# Patient Record
Sex: MALE | Race: WHITE | Employment: OTHER | URBAN - METROPOLITAN AREA
[De-identification: names, ages, dates, MRNs, and addresses within clinical notes are randomized per-mention and may not be internally consistent; named-entity substitution may affect disease eponyms.]

---

## 2017-10-02 ENCOUNTER — TELEPHONE (OUTPATIENT)
Dept: FAMILY MEDICINE | Facility: CLINIC | Age: 82
End: 2017-10-02

## 2017-10-02 DIAGNOSIS — L60.0 INGROWN TOENAIL: ICD-10-CM

## 2017-10-02 NOTE — TELEPHONE ENCOUNTER
Spoke to Lucho at [facility] pharmacy, prescription was erroneously sent to Dr Cobb.  Will send prescription to the correct provider.   Verbalized good understanding.   Dunia Zamudio RN

## 2017-10-02 NOTE — TELEPHONE ENCOUNTER
[facility] sent request for Gabapentin 300mg , take one capsule three times daily.  Last filled 7/10/2011

## 2017-10-02 NOTE — TELEPHONE ENCOUNTER
I have not seen the patient for 6 years he needs to reestablish care at our clinic if he wants us to take over prescribing these.  Hitesh Cobb MD

## 2021-12-08 ENCOUNTER — ANCILLARY PROCEDURE (OUTPATIENT)
Dept: CT IMAGING | Facility: CLINIC | Age: 86
End: 2021-12-08
Attending: NURSE PRACTITIONER
Payer: COMMERCIAL

## 2021-12-08 ENCOUNTER — OFFICE VISIT (OUTPATIENT)
Dept: URGENT CARE | Facility: URGENT CARE | Age: 86
End: 2021-12-08
Payer: COMMERCIAL

## 2021-12-08 ENCOUNTER — OFFICE VISIT (OUTPATIENT)
Dept: PEDIATRICS | Facility: CLINIC | Age: 86
End: 2021-12-08
Attending: FAMILY MEDICINE
Payer: COMMERCIAL

## 2021-12-08 VITALS
DIASTOLIC BLOOD PRESSURE: 74 MMHG | TEMPERATURE: 97.6 F | RESPIRATION RATE: 16 BRPM | HEART RATE: 112 BPM | WEIGHT: 185 LBS | OXYGEN SATURATION: 96 % | SYSTOLIC BLOOD PRESSURE: 127 MMHG

## 2021-12-08 VITALS
HEART RATE: 76 BPM | RESPIRATION RATE: 18 BRPM | SYSTOLIC BLOOD PRESSURE: 128 MMHG | TEMPERATURE: 97.7 F | OXYGEN SATURATION: 98 % | DIASTOLIC BLOOD PRESSURE: 76 MMHG

## 2021-12-08 DIAGNOSIS — E11.42 TYPE 2 DIABETES MELLITUS WITH DIABETIC POLYNEUROPATHY, WITHOUT LONG-TERM CURRENT USE OF INSULIN (H): Primary | ICD-10-CM

## 2021-12-08 DIAGNOSIS — R10.84 ABDOMINAL PAIN, GENERALIZED: ICD-10-CM

## 2021-12-08 DIAGNOSIS — N18.31 STAGE 3A CHRONIC KIDNEY DISEASE (H): ICD-10-CM

## 2021-12-08 DIAGNOSIS — R11.2 NAUSEA VOMITING AND DIARRHEA: ICD-10-CM

## 2021-12-08 DIAGNOSIS — K57.90 DIVERTICULOSIS OF INTESTINE, PART UNSPECIFIED, WITHOUT PERFORATION OR ABSCESS WITHOUT BLEEDING: ICD-10-CM

## 2021-12-08 DIAGNOSIS — R10.84 ABDOMINAL PAIN, GENERALIZED: Primary | ICD-10-CM

## 2021-12-08 DIAGNOSIS — R11.2 NAUSEA VOMITING AND DIARRHEA: Primary | ICD-10-CM

## 2021-12-08 DIAGNOSIS — R19.7 NAUSEA VOMITING AND DIARRHEA: Primary | ICD-10-CM

## 2021-12-08 DIAGNOSIS — R73.9 HYPERGLYCEMIA: ICD-10-CM

## 2021-12-08 DIAGNOSIS — R19.7 NAUSEA VOMITING AND DIARRHEA: ICD-10-CM

## 2021-12-08 PROBLEM — I65.22 STENOSIS OF LEFT CAROTID ARTERY: Status: ACTIVE | Noted: 2021-12-08

## 2021-12-08 PROBLEM — M75.110 PARTIAL TEAR OF ROTATOR CUFF: Status: ACTIVE | Noted: 2021-12-08

## 2021-12-08 PROBLEM — M47.816 DEGENERATIVE ARTHRITIS OF LUMBAR SPINE: Status: ACTIVE | Noted: 2021-12-08

## 2021-12-08 PROBLEM — J98.4 CHRONIC RESTRICTIVE LUNG DISEASE: Status: ACTIVE | Noted: 2021-12-08

## 2021-12-08 PROBLEM — K21.9 GASTROESOPHAGEAL REFLUX DISEASE: Status: ACTIVE | Noted: 2021-12-08

## 2021-12-08 PROBLEM — G47.00 INSOMNIA: Status: ACTIVE | Noted: 2021-12-08

## 2021-12-08 PROBLEM — Z86.73 HISTORY OF CEREBROVASCULAR ACCIDENT: Status: ACTIVE | Noted: 2021-12-08

## 2021-12-08 PROBLEM — G62.9 NEUROPATHY: Status: ACTIVE | Noted: 2021-12-08

## 2021-12-08 PROBLEM — E11.9 DM (DIABETES MELLITUS), TYPE 2 (H): Status: ACTIVE | Noted: 2021-12-08

## 2021-12-08 PROBLEM — I44.2 COMPLETE HEART BLOCK (H): Status: ACTIVE | Noted: 2021-12-08

## 2021-12-08 PROBLEM — D64.9 ANEMIA: Status: ACTIVE | Noted: 2021-12-08

## 2021-12-08 PROBLEM — I63.9 STROKE (H): Status: ACTIVE | Noted: 2021-12-08

## 2021-12-08 PROBLEM — M47.812 DEGENERATIVE ARTHRITIS OF CERVICAL SPINE: Status: ACTIVE | Noted: 2021-07-11

## 2021-12-08 PROBLEM — L65.9 ALOPECIA: Status: ACTIVE | Noted: 2021-12-08

## 2021-12-08 PROBLEM — Z95.0 PACEMAKER: Status: ACTIVE | Noted: 2021-12-08

## 2021-12-08 LAB
ALBUMIN SERPL-MCNC: 3.6 G/DL (ref 3.4–5)
ALBUMIN UR-MCNC: 10 MG/DL
ALP SERPL-CCNC: 114 U/L (ref 40–150)
ALT SERPL W P-5'-P-CCNC: 23 U/L (ref 0–70)
ANION GAP SERPL CALCULATED.3IONS-SCNC: 5 MMOL/L (ref 3–14)
APPEARANCE UR: CLEAR
AST SERPL W P-5'-P-CCNC: 12 U/L (ref 0–45)
BACTERIA #/AREA URNS HPF: ABNORMAL /HPF
BASOPHILS # BLD AUTO: 0.1 10E3/UL (ref 0–0.2)
BASOPHILS NFR BLD AUTO: 1 %
BILIRUB SERPL-MCNC: 0.8 MG/DL (ref 0.2–1.3)
BILIRUB UR QL STRIP: NEGATIVE
BUN SERPL-MCNC: 17 MG/DL (ref 7–30)
CALCIUM SERPL-MCNC: 9.2 MG/DL (ref 8.5–10.1)
CHLORIDE BLD-SCNC: 107 MMOL/L (ref 94–109)
CO2 SERPL-SCNC: 27 MMOL/L (ref 20–32)
COLOR UR AUTO: YELLOW
CREAT SERPL-MCNC: 1.06 MG/DL (ref 0.66–1.25)
CRP SERPL-MCNC: <2.9 MG/L (ref 0–8)
EOSINOPHIL # BLD AUTO: 0.1 10E3/UL (ref 0–0.7)
EOSINOPHIL NFR BLD AUTO: 2 %
ERYTHROCYTE [DISTWIDTH] IN BLOOD BY AUTOMATED COUNT: 12.8 % (ref 10–15)
ERYTHROCYTE [SEDIMENTATION RATE] IN BLOOD BY WESTERGREN METHOD: 21 MM/HR (ref 0–20)
GFR SERPL CREATININE-BSD FRML MDRD: 64 ML/MIN/1.73M2
GLUCOSE BLD-MCNC: 173 MG/DL (ref 70–99)
GLUCOSE UR STRIP-MCNC: 30 MG/DL
HBA1C MFR BLD: 8.3 % (ref 0–5.6)
HCT VFR BLD AUTO: 36.3 % (ref 40–53)
HGB BLD-MCNC: 12 G/DL (ref 13.3–17.7)
HGB UR QL STRIP: NEGATIVE
HOLD SPECIMEN: NORMAL
IMM GRANULOCYTES # BLD: 0 10E3/UL
IMM GRANULOCYTES NFR BLD: 0 %
INR PPP: 0.97 (ref 0.85–1.15)
KETONES UR STRIP-MCNC: NEGATIVE MG/DL
LEUKOCYTE ESTERASE UR QL STRIP: NEGATIVE
LIPASE SERPL-CCNC: 132 U/L (ref 73–393)
LYMPHOCYTES # BLD AUTO: 2 10E3/UL (ref 0.8–5.3)
LYMPHOCYTES NFR BLD AUTO: 25 %
MCH RBC QN AUTO: 30.8 PG (ref 26.5–33)
MCHC RBC AUTO-ENTMCNC: 33.1 G/DL (ref 31.5–36.5)
MCV RBC AUTO: 93 FL (ref 78–100)
MONOCYTES # BLD AUTO: 0.5 10E3/UL (ref 0–1.3)
MONOCYTES NFR BLD AUTO: 7 %
MUCOUS THREADS #/AREA URNS LPF: PRESENT /LPF
NEUTROPHILS # BLD AUTO: 5.3 10E3/UL (ref 1.6–8.3)
NEUTROPHILS NFR BLD AUTO: 65 %
NITRATE UR QL: NEGATIVE
NRBC # BLD AUTO: 0 10E3/UL
NRBC BLD AUTO-RTO: 0 /100
PH UR STRIP: 5.5 [PH] (ref 5–7)
PLATELET # BLD AUTO: 227 10E3/UL (ref 150–450)
POTASSIUM BLD-SCNC: 3.9 MMOL/L (ref 3.4–5.3)
PROT SERPL-MCNC: 7.1 G/DL (ref 6.8–8.8)
RBC # BLD AUTO: 3.89 10E6/UL (ref 4.4–5.9)
RBC #/AREA URNS AUTO: ABNORMAL /HPF
SODIUM SERPL-SCNC: 139 MMOL/L (ref 133–144)
SP GR UR STRIP: >1.05 (ref 1–1.03)
SQUAMOUS #/AREA URNS AUTO: ABNORMAL /LPF
UROBILINOGEN UR STRIP-MCNC: NORMAL MG/DL
WBC # BLD AUTO: 8.1 10E3/UL (ref 4–11)
WBC #/AREA URNS AUTO: ABNORMAL /HPF

## 2021-12-08 PROCEDURE — 85610 PROTHROMBIN TIME: CPT | Performed by: NURSE PRACTITIONER

## 2021-12-08 PROCEDURE — 81001 URINALYSIS AUTO W/SCOPE: CPT | Performed by: NURSE PRACTITIONER

## 2021-12-08 PROCEDURE — 96361 HYDRATE IV INFUSION ADD-ON: CPT | Performed by: NURSE PRACTITIONER

## 2021-12-08 PROCEDURE — 85652 RBC SED RATE AUTOMATED: CPT | Performed by: NURSE PRACTITIONER

## 2021-12-08 PROCEDURE — 85025 COMPLETE CBC W/AUTO DIFF WBC: CPT | Performed by: NURSE PRACTITIONER

## 2021-12-08 PROCEDURE — 74177 CT ABD & PELVIS W/CONTRAST: CPT | Performed by: STUDENT IN AN ORGANIZED HEALTH CARE EDUCATION/TRAINING PROGRAM

## 2021-12-08 PROCEDURE — 86140 C-REACTIVE PROTEIN: CPT | Performed by: NURSE PRACTITIONER

## 2021-12-08 PROCEDURE — 83036 HEMOGLOBIN GLYCOSYLATED A1C: CPT | Performed by: NURSE PRACTITIONER

## 2021-12-08 PROCEDURE — 99205 OFFICE O/P NEW HI 60 MIN: CPT | Mod: 25 | Performed by: NURSE PRACTITIONER

## 2021-12-08 PROCEDURE — 99207 REFERRAL TO ACUTE AND DIAGNOSTIC SERVICES: CPT | Performed by: FAMILY MEDICINE

## 2021-12-08 PROCEDURE — 80053 COMPREHEN METABOLIC PANEL: CPT | Performed by: NURSE PRACTITIONER

## 2021-12-08 PROCEDURE — 96360 HYDRATION IV INFUSION INIT: CPT | Performed by: NURSE PRACTITIONER

## 2021-12-08 PROCEDURE — 36415 COLL VENOUS BLD VENIPUNCTURE: CPT | Performed by: NURSE PRACTITIONER

## 2021-12-08 PROCEDURE — 83690 ASSAY OF LIPASE: CPT | Performed by: NURSE PRACTITIONER

## 2021-12-08 RX ORDER — FLUOCINOLONE ACETONIDE 0.1 MG/ML
SOLUTION TOPICAL
COMMUNITY
Start: 2021-12-07

## 2021-12-08 RX ORDER — IOPAMIDOL 755 MG/ML
114 INJECTION, SOLUTION INTRAVASCULAR ONCE
Status: COMPLETED | OUTPATIENT
Start: 2021-12-08 | End: 2021-12-08

## 2021-12-08 RX ORDER — TRAZODONE HYDROCHLORIDE 50 MG/1
50 TABLET, FILM COATED ORAL
COMMUNITY
Start: 2021-09-16 | End: 2022-03-10

## 2021-12-08 RX ORDER — KETOCONAZOLE 20 MG/ML
SHAMPOO TOPICAL
COMMUNITY
Start: 2021-12-07

## 2021-12-08 RX ADMIN — Medication 1000 ML: at 14:27

## 2021-12-08 RX ADMIN — IOPAMIDOL 114 ML: 755 INJECTION, SOLUTION INTRAVASCULAR at 15:11

## 2021-12-08 ASSESSMENT — PAIN SCALES - GENERAL: PAINLEVEL: MODERATE PAIN (5)
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
## 2021-12-08 NOTE — PROGRESS NOTES
Chief Complaint   Patient presents with     Abdominal Pain         ICD-10-CM    1. Type 2 diabetes mellitus with diabetic polyneuropathy, without long-term current use of insulin (H)  E11.42 metFORMIN (GLUCOPHAGE) 500 MG tablet   2. Nausea vomiting and diarrhea  R11.2 0.9% sodium chloride BOLUS    R19.7 CBC with platelets differential     Comprehensive metabolic panel     Clostridium difficile toxin B PCR     CRP inflammation     Erythrocyte sedimentation rate auto     INR     Lipase     UA with Microscopic reflex to Culture     CT Abdomen Pelvis w Contrast     UA with Microscopic reflex to Culture     Lipase     INR     Erythrocyte sedimentation rate auto     CRP inflammation     Comprehensive metabolic panel     CBC with platelets differential     Clostridium difficile toxin B PCR     Clostridium difficile toxin B PCR     Urine Microscopic     Adult Gastro Ref - Consult Only     CANCELED: Clostridium difficile toxin B PCR   3. Abdominal pain, generalized  R10.84 0.9% sodium chloride BOLUS     CBC with platelets differential     Comprehensive metabolic panel     CRP inflammation     Erythrocyte sedimentation rate auto     UA with Microscopic reflex to Culture     CT Abdomen Pelvis w Contrast     UA with Microscopic reflex to Culture     Erythrocyte sedimentation rate auto     CRP inflammation     Comprehensive metabolic panel     CBC with platelets differential     Clostridium difficile toxin B PCR     Clostridium difficile toxin B PCR     Urine Microscopic     Adult Gastro Ref - Consult Only   4. Stage 3a chronic kidney disease (H)  N18.31 Comprehensive metabolic panel     UA with Microscopic reflex to Culture     UA with Microscopic reflex to Culture     Comprehensive metabolic panel     Urine Microscopic   5. Diverticulosis of intestine, part unspecified, without perforation or abscess without bleeding   K57.90 INR     INR     Adult Gastro Ref - Consult Only   6. Hyperglycemia  R73.9 Hemoglobin A1c      Hemoglobin A1c     metFORMIN (GLUCOPHAGE) 500 MG tablet   Patient was given a total of 1500 mL of fluid and lung sounds remained clear.  He was much brighter and interactive after the fluids were infused.  Vital signs remained stable.    Restart Metformin 1 tablet in the morning with breakfast for 2 weeks then increase to 1 tablet with breakfast and 1 tablet with your evening meal.    Keep appointment with new provider on December 20, 2021 to review findings from this visit and establish care.    Follow-up with gastroenterology regarding your abdominal symptoms.    69 minutes spent on the date of the encounter doing chart review, history and exam, documentation and further activities per the note    Medical Decision Making    Differential Diagnosis:  A differential diagnosis for the abdominal pain includes but is not limited to: Gastritis, gastroenteritis, enteritis, colitis, irritable bowel disease, inflammatory bowel disease, appendicitis, viral infection, diverticulitis, ileus, bowel obstruction, volvulus, ileus, intussusception, gas pains, indigestion, gastroesophageal reflux, aortic pathology, urinary tract infection, pyelonephritis, cystitis, gastric ulcer, duodenal ulcer, viscus perforation, mesenteric ischemia, Crohn's disease, abdominal aortic dissection, ulcerative colitis, renal stone, biliary colic, cholecystitis, pancreatitis, abdominal hernia, internal hernia.    Narrative & Impression   Examination:  CT ABDOMEN PELVIS W CONTRAST 12/8/2021 3:33 PM      History: Diarrhea, nausea vomiting     Comparison: CT AP 11/12/2010     Technique: CT of the abdomen and pelvis were obtained with contrast.  Sagittal and coronal reconstructions created and reviewed. Contrast  dose: Isovue 370 114 cc     Findings:      Abdomen and pelvis: Normal hepatic parenchyma without focal lesions.  Gallbladder is unremarkable. No intra- or extrahepatic biliary  dilation. Spleen is unremarkable. Two soft tissue nodules in the  left  upper quadrant, most likely splenules. The pancreas and adrenal glands  are unremarkable. Kidneys demonstrate symmetric enhancement without  solid lesions, hydronephrosis, or nephrolithiasis. Ureters and urinary  bladder are unremarkable. Mild prostatomegaly with transitional zone  calcifications.     Stomach is unremarkable. No evidence of bowel obstruction. Minimal  colonic stool burden. Colonic diverticulosis, predominantly involving  the sigmoid colon, without evidence of acute diverticulitis. Normal  caliber appendix.     No suspicious abdominal or pelvic lymphadenopathy. No free fluid. No  pneumoperitoneum.     Abdominal aorta is normal in caliber and patent. Moderate calcified  atherosclerotic disease of the infrarenal aorta Celiac and mesenteric  artery origins are unremarkable. Portal veins and IVC are patent.     Lower thorax: Bibasilar scarring. Partially visualized cardiac  pacemaker wires terminating in the right atrium and ventricle. No  pericardial effusion.     Bones and soft tissues: No acute or suspicious osseous abnormality.  Degenerative changes of the lumbar spine. Mild degenerative changes of  bilateral hips, left greater than right. Dystrophic calcifications in  the overlying the gluteal musculature. Fatty replacement of the  paraspinal musculature.                                                                      Impression:   1. No radiographic findings to account for diarrhea or nausea. No  evidence of bowel obstruction or enteritis/colitis.  2. Colonic diverticulosis without evidence of acute diverticulitis.         Results for orders placed or performed in visit on 12/08/21 (from the past 24 hour(s))   Lipase   Result Value Ref Range    Lipase 132 73 - 393 U/L   INR   Result Value Ref Range    INR 0.97 0.85 - 1.15   Erythrocyte sedimentation rate auto   Result Value Ref Range    Erythrocyte Sedimentation Rate 21 (H) 0 - 20 mm/hr   CRP inflammation   Result Value Ref Range    CRP  Inflammation <2.9 0.0 - 8.0 mg/L   Comprehensive metabolic panel   Result Value Ref Range    Sodium 139 133 - 144 mmol/L    Potassium 3.9 3.4 - 5.3 mmol/L    Chloride 107 94 - 109 mmol/L    Carbon Dioxide (CO2) 27 20 - 32 mmol/L    Anion Gap 5 3 - 14 mmol/L    Urea Nitrogen 17 7 - 30 mg/dL    Creatinine 1.06 0.66 - 1.25 mg/dL    Calcium 9.2 8.5 - 10.1 mg/dL    Glucose 173 (H) 70 - 99 mg/dL    Alkaline Phosphatase 114 40 - 150 U/L    AST 12 0 - 45 U/L    ALT 23 0 - 70 U/L    Protein Total 7.1 6.8 - 8.8 g/dL    Albumin 3.6 3.4 - 5.0 g/dL    Bilirubin Total 0.8 0.2 - 1.3 mg/dL    GFR Estimate 64 >60 mL/min/1.73m2   CBC with platelets differential    Narrative    The following orders were created for panel order CBC with platelets differential.  Procedure                               Abnormality         Status                     ---------                               -----------         ------                     CBC with platelets and d...[[id number]]  Abnormal            Final result                 Please view results for these tests on the individual orders.   CBC with platelets and differential   Result Value Ref Range    WBC Count 8.1 4.0 - 11.0 10e3/uL    RBC Count 3.89 (L) 4.40 - 5.90 10e6/uL    Hemoglobin 12.0 (L) 13.3 - 17.7 g/dL    Hematocrit 36.3 (L) 40.0 - 53.0 %    MCV 93 78 - 100 fL    MCH 30.8 26.5 - 33.0 pg    MCHC 33.1 31.5 - 36.5 g/dL    RDW 12.8 10.0 - 15.0 %    Platelet Count 227 150 - 450 10e3/uL    % Neutrophils 65 %    % Lymphocytes 25 %    % Monocytes 7 %    % Eosinophils 2 %    % Basophils 1 %    % Immature Granulocytes 0 %    NRBCs per 100 WBC 0 <1 /100    Absolute Neutrophils 5.3 1.6 - 8.3 10e3/uL    Absolute Lymphocytes 2.0 0.8 - 5.3 10e3/uL    Absolute Monocytes 0.5 0.0 - 1.3 10e3/uL    Absolute Eosinophils 0.1 0.0 - 0.7 10e3/uL    Absolute Basophils 0.1 0.0 - 0.2 10e3/uL    Absolute Immature Granulocytes 0.0 <=0.4 10e3/uL    Absolute NRBCs 0.0 10e3/uL   Hemoglobin A1c   Result Value Ref  Range    Hemoglobin A1C 8.3 (H) 0.0 - 5.6 %   UA with Microscopic reflex to Culture    Specimen: Urine, Midstream   Result Value Ref Range    Color Urine Yellow Colorless, Straw, Light Yellow, Yellow    Appearance Urine Clear Clear    Glucose Urine 30  (A) Negative mg/dL    Bilirubin Urine Negative Negative    Ketones Urine Negative Negative mg/dL    Specific Gravity Urine >1.050 (H) 1.003 - 1.035    Blood Urine Negative Negative    pH Urine 5.5 5.0 - 7.0    Protein Albumin Urine 10  (A) Negative mg/dL    Nitrite Urine Negative Negative    Leukocyte Esterase Urine Negative Negative    Urobilinogen Urine Normal Normal, 2.0 mg/dL   Urine Microscopic   Result Value Ref Range    Bacteria Urine Few (A) None Seen /HPF    RBC Urine 0-2 0-2 /HPF /HPF    WBC Urine 0-5 0-5 /HPF /HPF    Squamous Epithelials Urine Few (A) None Seen /LPF    Mucus Urine Present (A) None Seen /LPF    Narrative    Urine Culture not indicated   Extra Tube    Narrative    The following orders were created for panel order Extra Tube.  Procedure                               Abnormality         Status                     ---------                               -----------         ------                     Extra Serum Separator Tu...[[id number]]                      Final result                 Please view results for these tests on the individual orders.   Extra Serum Separator Tube (SST)   Result Value Ref Range    Hold Specimen JIC        Subjective     Olegario Packer is an 86 year old male who presents to clinic today for generalized abdominal pain that he has had for over a year.  1 week ago he started having diarrhea with every meal he eats and sometimes in between.  He also is vomiting several times a day.  He does have a history of diverticulosis consists.  He does not generally have nausea preceding the vomiting.  He has not had a colonoscopy in many years, denies any family history of colon cancer.  Received call from Dr. Burrell at the  [facility] asking us to further assess him.  He was not able to void at the urgent care and has not voided since first thing this morning.  He reports he does not drink water he only drinks 2 cups of caffeinated coffee a day.      ROS: 10 point ROS neg other than the symptoms noted above in the HPI.       Objective    /76 (BP Location: Right arm, Patient Position: Semi-South's, Cuff Size: Adult Regular)   Pulse 76   Temp 97.7  F (36.5  C) (Oral)   Resp 18   SpO2 98%   Nurses notes and VS have been reviewed.    Physical Exam       GENERAL APPEARANCE: healthy appearing, alert     EYES: PERRL, EOMI, sclera non-icteric     HENT: ear canals and TM's normal and mucous membranes are dry     NECK: no adenopathy or asymmetry, thyroid normal to palpation     RESP: End expiratory wheezes heard in bases bilaterally, no increased work of breathing noted     CV: regular rates and rhythm, no murmurs, rubs, or gallop     ABDOMEN: Semifirm, non-tender, normal bowel sounds     MS: extremities normal- no gross deformities noted; normal muscle tone.     SKIN: no suspicious lesions or rashes     NEURO: Normal strength and tone, mentation intact and speech normal     PSYCH: normal thought process; no significant mood disturbance    Patient Instructions   Restart Metformin 1 tablet in the morning with breakfast for 2 weeks then increase to 1 tablet with breakfast and 1 tablet with your evening meal.    Keep appointment with new provider on December 20, 2021 to review findings from this visit and establish care.    Follow-up with gastroenterology regarding your abdominal symptoms.          rPatient Education   Diabetes and Chronic Kidney Disease (CKD)  How does diabetes affect my body?  When diabetes is not well controlled, the sugar level in your blood goes up. Many different parts of your body are affected when your blood sugar is high: kidneys, blood vessels, heart, eyes, feet and nerves. Diabetes can also cause high  blood pressure, which can also damage your kidneys.  How does diabetes harm the kidneys?  Your kidneys filter waste from your body. With diabetes, these filters are damaged. This happens because diabetes injures the blood vessels in your kidneys and they can't clean your blood as well as they should. The waste will build up in your blood.  Diabetes can also injure the nerves in your body. This may make it hard for you to empty your bladder. A full bladder can put stress on the kidneys or cause an infection.  Do people with diabetes have a greater risk for getting kidney disease?  YES. Diabetes is the number one cause of chronic kidney disease (CKD). About 1 out of 3 people with diabetes may get CKD. Certain groups may have a higher risk of getting kidney disease than others. Your risk may be higher if you:    Have high blood pressure    Have poorly controlled blood sugar levels    Have a family member with CKD.  How can I slow the progression of CKD?    Know your blood sugar and Alc goals.    Control your blood sugar with the help of your diabetes care team:  ? Dietician  ? Diabetic educator  ? Nurse  ? Doctor    Keep your blood pressure under control.  ?  Know your blood pressure goals.  ? Take your medicines as directed.  ? Check your blood pressure at home as directed.  ? Do not stop taking your medicine when your blood pressure is under control. You still need to take medicine to maintain control.  ? Do NOT smoke.  ? Call your care team if you have questions.    Follow the exercisse and diet guidelines your care team gives you.  Pregnancy and diabetes  Having CKD and diabetes is serious. If you are thinking about getting pregnant, please tell your care team.  Rembember:    Diabetes is the leading cause of CKD.    1 out of 3 people with diabetes may develop kidney failure.    Controlling diabetes can prevent CKD from getting worse.  Other resources  National Kid[employer] Foundation   www.kidney.org  1-432.923.3347  For  informational purposes only. Not to replace the advice of your health care provider.   Copyright   2016 [facility]. All rights reserved. [employer] [id number] - 03/16.       Patient Education     For informational purposes only. Not to replace the advice of your health care provider.  Copyright   2006 [facility]. All rights reserved. [employer] 289643 - 11/15.  Coping with Nerve Damage (Peripheral Neuropathy)  What causes nerve damage?  Some illnesses, certain medicines, injury and very poor diet may cause problems with some nerves in your body (called peripheral neuropathy).  You may feel tingling, itching, burning, weakness or numbness in your fingers, hands, toes or feet. You may have less feeling: You may be unable to feel hot, cold or pain.  While this may improve over time, there could be long-term problems.  How is it treated?  If your chemotherapy drugs are the cause of the nerve damage, your care team may stop them or change to safer drugs.  There are several medicines that are helpful in treating nerve damage.  You may also ask your care team about alternate treatments, such as:    Acupuncture    Massage    Physical therapy    A referral to [facility].  What else can I do to protect myself?    Protect your feet, especially if they are numb or have less feeling than usual. Always wear well-fitted shoes with rubber soles, even around the house.    Move carefully, especially on the stairs. Use the handrails.    Remove items, such as rugs, that might make you trip. Use bath mats in the tub or shower so you do not slip and fall.    Protect your hands and fingers if they are numb or have less feeling. Be careful when grasping hot or sharp objects.    Lower the temperature on your hot water heater as needed.    Wear padded gloves when you reach into the oven or  hot pots and pans. Be extra careful when cooking or ironing.    Wear heavy work gloves  when you dig in the garden or work around thorny plants.    Share your feelings and worries about nerve damage with your family, friends, lora leader and care team. A support group may also help--be sure to ask your care team for information.  When should I call my care team?  Call your care team if:    The symptoms (tingling, burning, weakness, numbness) get worse in your fingers, hands, toes or feet.    You have trouble using buttons or zippers on your clothes.    You have trouble walking.    You hurt yourself and the area becomes red, painful or swollen.  Comments:  __________________________________________  __________________________________________  __________________________________________  __________________________________________  __________________________________________  __________________________________________           Patient Education     Taking Medicine for Diabetes     Medicines can t cure diabetes. But they can delay or prevent health problems. They do this by helping you manage your blood sugar. Taking medicines every day, especially shots, may seem hard. But they are powerful tools. And they can help you stay in control of your health.  Where the medicines work  Diabetes medicines act on different parts of the body. Many of them affect how the pancreas makes insulin. Others increase how sensitive muscle and fat cells are to insulin. Or they keep the liver from releasing too much glucose. And some cause carbohydrates to break down more slowly. Another type of medicine stops the kidneys from reabsorbing glucose from the urine. The diagram on this sheet shows where each class of medicine works in the body.  Getting familiar with shots  Insulin can't be taken as a pill. It is often injected through the skin to reach the blood. It s not hard to give yourself shots. You may find that they aren t as bad as you fear. And there are new devices for injecting or breathing in insulin. Ask your  healthcare provider for more information.  Sticking to your medicine routine  It's important to take your medicines at the right times. This will give you the best control over your blood sugar. Having a medicine routine can help keep your blood sugar steady. Keep track of medicines with a pill organizer. And make a daily schedule. Ask your family to help you stick to a medicine routine. Make it a priority.  If you take other medicines  Medicines of all types can affect blood sugar. This includes over-the-counter medicines. And also medicines prescribed for other health problems. Tell your healthcare provider about all the medicines you take. This includes herbs, vitamins, and other supplements. And always tell the pharmacist that you have diabetes when buying other medicines.  Wear a medical ID alert bracelet or necklace. And carry a list of your medicines with you. This is helpful in case of an emergency. When you see your provider, bring your medicine list. Check that your health records are up to date. They should have your current medicines and dosages.  [employer] last reviewed this educational content on 11/1/2019 2000-2021 [employer]. All rights reserved. This information is not intended as a substitute for professional medical care. Always follow your healthcare professional's instructions.           Patient Education     Understanding Diabetic Gastroparesis  Gastroparesis is when food moves through the stomach more slowly than normal. It s also called delayed gastric emptying.    How to say it   GAS-[id number]-puh-REE-sis  What causes diabetic gastroparesis?  The vagus nerve helps control how food moves through the digestive system. This nerve can be damaged from long-term (chronic) high blood sugar from diabetes that is not well treated. Then food moves more slowly. Or it stops moving. Gastroparesis can also be caused by injury to the nerves and smooth muscle cells that line the stomach wall.  These work to push the food along for digestion.   Symptoms of diabetic gastroparesis  Symptoms can include:    Nausea    Vomiting    Feeling full after eating a small amount of food    Belly pain or cramps    Heartburn    Belly bloating    Weight loss    Loss of appetite    High or low blood sugar levels  Diagnosing diabetic gastroparesis  Your healthcare provider will give you a physical exam and ask about your past health. You may have tests such as:     Blood tests. These tests check your levels of different blood cells. They also measure your chemical and mineral (electrolyte) levels.    Upper GI (gastrointestinal) series. This is also called a barium swallow. This test checks your food pipe (esophagus) and your stomach. It also looks at the first part of your small intestine (duodenum). The test includes swallowing a liquid that can be seen on X-rays. The liquid is then tracked through your stomach using X-rays.    Radioisotope gastric-emptying scan. This test lets the provider see food in your stomach during the scan. He or she can see how quickly food leaves your stomach. Food marked with a radioisotope is swallowed and tracked by a scanner to find out how long it takes to get through the stomach.  ? Gastric emptying breath test.  This test measures how fast a meal containing a certain non-radioactive form of carbon leaves your stomach. The carbon is absorbed in the upper GI tract. It's then exhaled in your breath.  ? Gastric emptying of radiopaque markers. This test lets the provider see how quickly small plastic markers that are mixed with a meal leave your stomach. The markers are seen on X-rays.    Gastric manometry. This is also called antroduodenal manometry. This test checks the movement of muscles in your stomach and small intestine.    Upper endoscopy. The doctor uses a scope to look at the inside of your esophagus, stomach, and duodenum, while you are sedated. This test is important to be sure you  don't have other conditions besides gastroparesis that could be causing your symptoms.    Wireless capsule study. For this test, you swallow a small capsule that has a tiny camera. This test measures stomach emptying.    Scintigraphic gastric accommodation test. This test also uses food marked with a radioisotope tracked by a scanner to measure your stomach contents before and after a meal. This checks how well your stomach relaxes after you eat.  Treatment for diabetic gastroparesis  Your healthcare provider will create a care plan for you that may include:    Taking medicines. You may be prescribed medicine to help with blood sugar levels. Or to treat nausea and vomiting. Or to act on muscles in the digestive system. Your healthcare provider may prescribe a few medicines to see which ones work best.    Stopping some medicines. Your provider may tell you to stop taking any medicines that slow digestion.    Changing your diet. Making changes to your eating habits can help control the problem. (See Changing your diet, below.)    Gastric neurotransmitter. This device may help control nausea and vomiting. It's put into your body by surgery. It helps stimulate the stomach muscles.    Feeding by IV(parenteral nutrition). This is for severe cases when a person can't take food by mouth. Instead, nutrients are put right into your veins. A tube is put into one of your chest veins during a surgery. A bag with liquid nutrients or medicine is joined to the tube several times daily.    Surgery. In very severe cases, you may need surgery called a jejunostomy. A feeding tube is put through your belly into your small intestine. This tube lets nutrients go right into your small intestine instead of your stomach.  Changing your diet  These changes may help reduce the problem:    Eat 6 smaller meals a day instead of 3 large meals.    Have a few liquid meals a day instead of solid food. You may need to do this until your blood sugar  levels are stable.    Don t eat foods high in fat. This includes fried food, fatty meats, and high-fat dairy foods. These can slow your digestion.    Don t eat foods high in insoluble fiber. This includes beans and many fruits and vegetables. These can be hard to digest.   Talk with your healthcare provider or a dietitian about an eating plan that is best for you.   Possible complications of diabetic gastroparesis  Food that stays in the stomach for too long can cause problems. Food can ferment in the stomach. This can cause bacteria to grow. Undigested food can harden into a lump called a bezoar. This can cause nausea and vomiting. In some cases, it may block food from passing from the stomach to the small intestine. Gastroparesis can make it hard to manage blood sugar levels. It can also cause problems with vitamins and minerals being absorbed into the body. And it can make it hard to keep a healthy weight.   Living with diabetic gastroparesis  For many people, gastroparesis is a lifelong condition. With diabetes, the main goal is to control your blood sugar levels. Follow up with your healthcare provider as advised. You may need regular visits to manage your health.   When to call your healthcare provider  Call your healthcare provider if you have any of the following:    Severe belly pain    Can't keep down food or liquids    Weight loss    Unable to control your blood sugars (either too high or too low)    Other symptoms as advised by your healthcare provider  Edwina last reviewed this educational content on 12/1/2019 2000-2021 [employer]. All rights reserved. This information is not intended as a substitute for professional medical care. Always follow your healthcare professional's instructions.           Patient Education     Dehydration (Adult)  Dehydration occurs when your body loses too much fluid. This may be the result of prolonged vomiting or diarrhea, excessive sweating, or a high  fever. It may also happen if you don t drink enough fluid when you re sick or out in the heat. Misuse of diuretics (water pills) can also be a cause.   Symptoms include thirst, decreased urine output, and darker colored urine. You may also feel dizzy, weak, fatigued, or very drowsy. The diet described below is usually enough to treat dehydration. In some cases, you may need medicine.   Home care    Drink at least 12, 8-ounce glasses of fluid every day to resolve the dehydration. Fluid may include water; orange juice; lemonade; apple, grape, or cranberry juice; clear fruit drinks; electrolyte replacement and sports drinks; and teas and coffee without caffeine. Don't drink alcohol. If you have been diagnosed with a kidney disease, ask your doctor how much and what types of fluids you should drink to prevent dehydration. If you have kidney disease, fluid can build up in the body. This can be dangerous to your health.    If you have a fever, muscle aches, or a headache as a result of a cold or flu, you may take acetaminophen or ibuprofen, unless another medicine was prescribed. If you have chronic liver or kidney disease, or have ever had a stomach ulcer or gastrointestinal bleeding, talk with your healthcare provider before using these medicines. Don't take aspirin if you are younger than 18 and have a fever. In children with fever, aspirin raises the chance for severe liver injury and death.    Follow-up care  Follow up with your healthcare provider, or as advised.   When to seek medical advice  Call your healthcare provider right away if any of these occur:     Continued vomiting    Frequent diarrhea (more than 5 times a day); blood (red or black color) or mucus in diarrhea    Swollen abdomen or increasing abdominal pain    Reduced urine output or extreme thirst    Fever of 100.4 F (38 C) or higher  Call 911  Call 911 or get medical care right away if you have any of the following:     Weakness, dizziness, or  fainting    Unusual drowsiness or confusion    Blood in vomit or stool  [employer] last reviewed this educational content on 12/1/2019 2000-2021 [employer]. All rights reserved. This information is not intended as a substitute for professional medical care. Always follow your healthcare professional's instructions.               JESSE Guido, [facility] Provider    The use of Dragon/Meshify dictation services may have been used to construct the content in this note; any grammatical or spelling errors are non-intentional. Please contact the author of this note directly if you are in need of any clarification.

## 2021-12-08 NOTE — PATIENT INSTRUCTIONS
Restart Metformin 1 tablet in the morning with breakfast for 2 weeks then increase to 1 tablet with breakfast and 1 tablet with your evening meal.    Keep appointment with new provider on December 20, 2021 to review findings from this visit and establish care.    Follow-up with gastroenterology regarding your abdominal symptoms.          rPatient Education   Diabetes and Chronic Kidney Disease (CKD)  How does diabetes affect my body?  When diabetes is not well controlled, the sugar level in your blood goes up. Many different parts of your body are affected when your blood sugar is high: kidneys, blood vessels, heart, eyes, feet and nerves. Diabetes can also cause high blood pressure, which can also damage your kidneys.  How does diabetes harm the kidneys?  Your kidneys filter waste from your body. With diabetes, these filters are damaged. This happens because diabetes injures the blood vessels in your kidneys and they can't clean your blood as well as they should. The waste will build up in your blood.  Diabetes can also injure the nerves in your body. This may make it hard for you to empty your bladder. A full bladder can put stress on the kidneys or cause an infection.  Do people with diabetes have a greater risk for getting kidney disease?  YES. Diabetes is the number one cause of chronic kidney disease (CKD). About 1 out of 3 people with diabetes may get CKD. Certain groups may have a higher risk of getting kidney disease than others. Your risk may be higher if you:    Have high blood pressure    Have poorly controlled blood sugar levels    Have a family member with CKD.  How can I slow the progression of CKD?    Know your blood sugar and Alc goals.    Control your blood sugar with the help of your diabetes care team:  ? Dietician  ? Diabetic educator  ? Nurse  ? Doctor    Keep your blood pressure under control.  ?  Know your blood pressure goals.  ? Take your medicines as directed.  ? Check your blood pressure  at home as directed.  ? Do not stop taking your medicine when your blood pressure is under control. You still need to take medicine to maintain control.  ? Do NOT smoke.  ? Call your care team if you have questions.    Follow the exercisse and diet guidelines your care team gives you.  Pregnancy and diabetes  Having CKD and diabetes is serious. If you are thinking about getting pregnant, please tell your care team.  [place]:    Diabetes is the leading cause of CKD.    1 out of 3 people with diabetes may develop kidney failure.    Controlling diabetes can prevent CKD from getting worse.  Other resources  National Kidney Foundation   www.kidney.org  0-472-692-7115  For informational purposes only. Not to replace the advice of your health care provider.   Copyright   2016 [facility]. All rights reserved. [employer] [id number] - 03/16.       Patient Education     For informational purposes only. Not to replace the advice of your health care provider.  Copyright   2006 [employer] [facility]. All rights reserved. [employer] [id number] - 11/15.  Coping with Nerve Damage (Peripheral Neuropathy)  What causes nerve damage?  Some illnesses, certain medicines, injury and very poor diet may cause problems with some nerves in your body (called peripheral neuropathy).  You may feel tingling, itching, burning, weakness or numbness in your fingers, hands, toes or feet. You may have less feeling: You may be unable to feel hot, cold or pain.  While this may improve over time, there could be long-term problems.  How is it treated?  If your chemotherapy drugs are the cause of the nerve damage, your care team may stop them or change to safer drugs.  There are several medicines that are helpful in treating nerve damage.  You may also ask your care team about alternate treatments, such as:    Acupuncture    Massage    Physical therapy    A referral to [facility].  What else can I do to protect  myself?    Protect your feet, especially if they are numb or have less feeling than usual. Always wear well-fitted shoes with rubber soles, even around the house.    Move carefully, especially on the stairs. Use the handrails.    Remove items, such as rugs, that might make you trip. Use bath mats in the tub or shower so you do not slip and fall.    Protect your hands and fingers if they are numb or have less feeling. Be careful when grasping hot or sharp objects.    Lower the temperature on your hot water heater as needed.    Wear padded gloves when you reach into the oven or  hot pots and pans. Be extra careful when cooking or ironing.    Wear heavy work gloves when you dig in the garden or work around thorny plants.    Share your feelings and worries about nerve damage with your family, friends, lora leader and care team. A support group may also help--be sure to ask your care team for information.  When should I call my care team?  Call your care team if:    The symptoms (tingling, burning, weakness, numbness) get worse in your fingers, hands, toes or feet.    You have trouble using buttons or zippers on your clothes.    You have trouble walking.    You hurt yourself and the area becomes red, painful or swollen.  Comments:  __________________________________________  __________________________________________  __________________________________________  __________________________________________  __________________________________________  __________________________________________           Patient Education     Taking Medicine for Diabetes     Medicines can t cure diabetes. But they can delay or prevent health problems. They do this by helping you manage your blood sugar. Taking medicines every day, especially shots, may seem hard. But they are powerful tools. And they can help you stay in control of your health.  Where the medicines work  Diabetes medicines act on different parts of the body. Many of  them affect how the pancreas makes insulin. Others increase how sensitive muscle and fat cells are to insulin. Or they keep the liver from releasing too much glucose. And some cause carbohydrates to break down more slowly. Another type of medicine stops the kidneys from reabsorbing glucose from the urine. The diagram on this sheet shows where each class of medicine works in the body.  Getting familiar with shots  Insulin can't be taken as a pill. It is often injected through the skin to reach the blood. It s not hard to give yourself shots. You may find that they aren t as bad as you fear. And there are new devices for injecting or breathing in insulin. Ask your healthcare provider for more information.  Sticking to your medicine routine  It's important to take your medicines at the right times. This will give you the best control over your blood sugar. Having a medicine routine can help keep your blood sugar steady. Keep track of medicines with a pill organizer. And make a daily schedule. Ask your family to help you stick to a medicine routine. Make it a priority.  If you take other medicines  Medicines of all types can affect blood sugar. This includes over-the-counter medicines. And also medicines prescribed for other health problems. Tell your healthcare provider about all the medicines you take. This includes herbs, vitamins, and other supplements. And always tell the pharmacist that you have diabetes when buying other medicines.  Wear a medical ID alert bracelet or necklace. And carry a list of your medicines with you. This is helpful in case of an emergency. When you see your provider, bring your medicine list. Check that your health records are up to date. They should have your current medicines and dosages.  [employer] last reviewed this educational content on 11/1/2019 2000-2021 [employer]. All rights reserved. This information is not intended as a substitute for professional medical care.  Always follow your healthcare professional's instructions.           Patient Education     Understanding Diabetic Gastroparesis  Gastroparesis is when food moves through the stomach more slowly than normal. It s also called delayed gastric emptying.    How to say it   [id number]  What causes diabetic gastroparesis?  The vagus nerve helps control how food moves through the digestive system. This nerve can be damaged from long-term (chronic) high blood sugar from diabetes that is not well treated. Then food moves more slowly. Or it stops moving. Gastroparesis can also be caused by injury to the nerves and smooth muscle cells that line the stomach wall. These work to push the food along for digestion.   Symptoms of diabetic gastroparesis  Symptoms can include:    Nausea    Vomiting    Feeling full after eating a small amount of food    Belly pain or cramps    Heartburn    Belly bloating    Weight loss    Loss of appetite    High or low blood sugar levels  Diagnosing diabetic gastroparesis  Your healthcare provider will give you a physical exam and ask about your past health. You may have tests such as:     Blood tests. These tests check your levels of different blood cells. They also measure your chemical and mineral (electrolyte) levels.    Upper GI (gastrointestinal) series. This is also called a barium swallow. This test checks your food pipe (esophagus) and your stomach. It also looks at the first part of your small intestine (duodenum). The test includes swallowing a liquid that can be seen on X-rays. The liquid is then tracked through your stomach using X-rays.    Radioisotope gastric-emptying scan. This test lets the provider see food in your stomach during the scan. He or she can see how quickly food leaves your stomach. Food marked with a radioisotope is swallowed and tracked by a scanner to find out how long it takes to get through the stomach.  ? Gastric emptying breath test.  This test measures  how fast a meal containing a certain non-radioactive form of carbon leaves your stomach. The carbon is absorbed in the upper GI tract. It's then exhaled in your breath.  ? Gastric emptying of radiopaque markers. This test lets the provider see how quickly small plastic markers that are mixed with a meal leave your stomach. The markers are seen on X-rays.    Gastric manometry. This is also called antroduodenal manometry. This test checks the movement of muscles in your stomach and small intestine.    Upper endoscopy. The doctor uses a scope to look at the inside of your esophagus, stomach, and duodenum, while you are sedated. This test is important to be sure you don't have other conditions besides gastroparesis that could be causing your symptoms.    Wireless capsule study. For this test, you swallow a small capsule that has a tiny camera. This test measures stomach emptying.    Scintigraphic gastric accommodation test. This test also uses food marked with a radioisotope tracked by a scanner to measure your stomach contents before and after a meal. This checks how well your stomach relaxes after you eat.  Treatment for diabetic gastroparesis  Your healthcare provider will create a care plan for you that may include:    Taking medicines. You may be prescribed medicine to help with blood sugar levels. Or to treat nausea and vomiting. Or to act on muscles in the digestive system. Your healthcare provider may prescribe a few medicines to see which ones work best.    Stopping some medicines. Your provider may tell you to stop taking any medicines that slow digestion.    Changing your diet. Making changes to your eating habits can help control the problem. (See Changing your diet, below.)    Gastric neurotransmitter. This device may help control nausea and vomiting. It's put into your body by surgery. It helps stimulate the stomach muscles.    Feeding by IV(parenteral nutrition). This is for severe cases when a person  can't take food by mouth. Instead, nutrients are put right into your veins. A tube is put into one of your chest veins during a surgery. A bag with liquid nutrients or medicine is joined to the tube several times daily.    Surgery. In very severe cases, you may need surgery called a jejunostomy. A feeding tube is put through your belly into your small intestine. This tube lets nutrients go right into your small intestine instead of your stomach.  Changing your diet  These changes may help reduce the problem:    Eat 6 smaller meals a day instead of 3 large meals.    Have a few liquid meals a day instead of solid food. You may need to do this until your blood sugar levels are stable.    Don t eat foods high in fat. This includes fried food, fatty meats, and high-fat dairy foods. These can slow your digestion.    Don t eat foods high in insoluble fiber. This includes beans and many fruits and vegetables. These can be hard to digest.   Talk with your healthcare provider or a dietitian about an eating plan that is best for you.   Possible complications of diabetic gastroparesis  Food that stays in the stomach for too long can cause problems. Food can ferment in the stomach. This can cause bacteria to grow. Undigested food can harden into a lump called a bezoar. This can cause nausea and vomiting. In some cases, it may block food from passing from the stomach to the small intestine. Gastroparesis can make it hard to manage blood sugar levels. It can also cause problems with vitamins and minerals being absorbed into the body. And it can make it hard to keep a healthy weight.   Living with diabetic gastroparesis  For many people, gastroparesis is a lifelong condition. With diabetes, the main goal is to control your blood sugar levels. Follow up with your healthcare provider as advised. You may need regular visits to manage your health.   When to call your healthcare provider  Call your healthcare provider if you have any of  the following:    Severe belly pain    Can't keep down food or liquids    Weight loss    Unable to control your blood sugars (either too high or too low)    Other symptoms as advised by your healthcare provider  Edwina last reviewed this educational content on 12/1/2019 2000-2021 [employer]. All rights reserved. This information is not intended as a substitute for professional medical care. Always follow your healthcare professional's instructions.           Patient Education     Dehydration (Adult)  Dehydration occurs when your body loses too much fluid. This may be the result of prolonged vomiting or diarrhea, excessive sweating, or a high fever. It may also happen if you don t drink enough fluid when you re sick or out in the heat. Misuse of diuretics (water pills) can also be a cause.   Symptoms include thirst, decreased urine output, and darker colored urine. You may also feel dizzy, weak, fatigued, or very drowsy. The diet described below is usually enough to treat dehydration. In some cases, you may need medicine.   Home care    Drink at least 12, 8-ounce glasses of fluid every day to resolve the dehydration. Fluid may include water; orange juice; lemonade; apple, grape, or cranberry juice; clear fruit drinks; electrolyte replacement and sports drinks; and teas and coffee without caffeine. Don't drink alcohol. If you have been diagnosed with a kidney disease, ask your doctor how much and what types of fluids you should drink to prevent dehydration. If you have kidney disease, fluid can build up in the body. This can be dangerous to your health.    If you have a fever, muscle aches, or a headache as a result of a cold or flu, you may take acetaminophen or ibuprofen, unless another medicine was prescribed. If you have chronic liver or kidney disease, or have ever had a stomach ulcer or gastrointestinal bleeding, talk with your healthcare provider before using these medicines. Don't take  aspirin if you are younger than 18 and have a fever. In children with fever, aspirin raises the chance for severe liver injury and death.    Follow-up care  Follow up with your healthcare provider, or as advised.   When to seek medical advice  Call your healthcare provider right away if any of these occur:     Continued vomiting    Frequent diarrhea (more than 5 times a day); blood (red or black color) or mucus in diarrhea    Swollen abdomen or increasing abdominal pain    Reduced urine output or extreme thirst    Fever of 100.4 F (38 C) or higher  Call 911  Call 911 or get medical care right away if you have any of the following:     Weakness, dizziness, or fainting    Unusual drowsiness or confusion    Blood in vomit or stool  [employer] last reviewed this educational content on 12/1/2019 2000-2021 [employer]. All rights reserved. This information is not intended as a substitute for professional medical care. Always follow your healthcare professional's instructions.

## 2021-12-20 ENCOUNTER — OFFICE VISIT (OUTPATIENT)
Dept: FAMILY MEDICINE | Facility: CLINIC | Age: 86
End: 2021-12-20
Payer: COMMERCIAL

## 2021-12-20 VITALS
TEMPERATURE: 96.5 F | HEART RATE: 78 BPM | HEIGHT: 72 IN | BODY MASS INDEX: 25.19 KG/M2 | OXYGEN SATURATION: 98 % | WEIGHT: 186 LBS | SYSTOLIC BLOOD PRESSURE: 136 MMHG | DIASTOLIC BLOOD PRESSURE: 70 MMHG

## 2021-12-20 DIAGNOSIS — A02.9 SALMONELLA: ICD-10-CM

## 2021-12-20 DIAGNOSIS — R10.84 ABDOMINAL PAIN, GENERALIZED: Primary | ICD-10-CM

## 2021-12-20 PROCEDURE — 83630 LACTOFERRIN FECAL (QUAL): CPT | Performed by: FAMILY MEDICINE

## 2021-12-20 PROCEDURE — 87177 OVA AND PARASITES SMEARS: CPT | Performed by: FAMILY MEDICINE

## 2021-12-20 PROCEDURE — 82274 ASSAY TEST FOR BLOOD FECAL: CPT | Performed by: FAMILY MEDICINE

## 2021-12-20 PROCEDURE — 87493 C DIFF AMPLIFIED PROBE: CPT | Performed by: FAMILY MEDICINE

## 2021-12-20 PROCEDURE — 99213 OFFICE O/P EST LOW 20 MIN: CPT | Performed by: FAMILY MEDICINE

## 2021-12-20 PROCEDURE — 87209 SMEAR COMPLEX STAIN: CPT | Performed by: FAMILY MEDICINE

## 2021-12-20 PROCEDURE — 87506 IADNA-DNA/RNA PROBE TQ 6-11: CPT | Mod: 59 | Performed by: FAMILY MEDICINE

## 2021-12-20 RX ORDER — METOPROLOL SUCCINATE 25 MG/1
1 TABLET, EXTENDED RELEASE ORAL DAILY
COMMUNITY
Start: 2021-12-16 | End: 2022-03-10

## 2021-12-20 ASSESSMENT — MIFFLIN-ST. JEOR: SCORE: 1558.75

## 2021-12-20 ASSESSMENT — PAIN SCALES - GENERAL: PAINLEVEL: NO PAIN (0)

## 2021-12-20 NOTE — PROGRESS NOTES
"  Assessment & Plan     Abdominal pain, generalized  Patient is here with his son for concern of abdominal pain described as abdominal discomfort , food makes it better, pain is worse with nothing.  No specific triggering factors.  His main concern at that he gets diarrhea after eating meals.  He has not been taking Metformin, he just restarted recently.  Initially he was seen in urgent care on 2021 and was sent to acute diagnostic center.  He had CT abdomen showin. No radiographic findings to account for diarrhea or nausea. No  evidence of bowel obstruction or enteritis/colitis.  2. Colonic diverticulosis without evidence of acute diverticulitis.  Patient was given normal saline 1 L bolus and referred to GI.  Labs and ultrasound were ordered.  Stool studies test has not done been yet.   I had a  discussion with the patient and his son about his condition , diagnosis treatment options. We discussed diffenetial diagnosis, and expected course.  He is pain likely related to metabolic syndrome.  We will obtain stool studies to rule out C. difficile and other issues.  In the meantime I advised the patient to keep appointment with GI.  He is going to return for another visit for medication review and follow-up on other chronic medical problems..  I recommend the following :    - Enteric Bacteria and Virus Panel by ISABELLA Stool; Future  - Ova and Parasite Exam Routine; Future  - Occult blood fecal HGB immuno; Future  - Clostridium difficile Toxin B PCR; Future  - Fecal Lactoferrin; Future  - Enteric Bacteria and Virus Panel by ISABELLA Stool  - Ova and Parasite Exam Routine  - Occult blood fecal HGB immuno  - Clostridium difficile Toxin B PCR  - Fecal Lactoferrin  Patient verbalized understanding and agreed on the plan of care.  All questions answered.           BMI:   Estimated body mass index is 25.58 kg/m  as calculated from the following:    Height as of this encounter: 1.816 m (5' 11.5\").    Weight as of this " "encounter: 84.4 kg (186 lb).           No follow-ups on file.    MD LINO Guzman [facility] BRISA Melvin is a 86 year old who presents for the following health issues     HPI     Diarrhea  Onset/Duration: 2 months  Description:       Consistency of stool: watery and loose       Blood in stool: no       Number of loose stools past 24 hours: 4-5  Progression of Symptoms: worsening  Accompanying signs and symptoms:       Fever: no       Nausea/Vomiting: no       Abdominal pain: no       Weight loss: YES       Episodes of constipation: no  History   Ill contacts: no  Recent use of antibiotics: no  Recent travels: no  Recent medication-new or changes(Rx or OTC): YES  Precipitating or alleviating factors: None  Therapies tried and outcome: none        Patient is here with his son for concern of abdominal pain described as abdominal discomfort , food makes it better, pain is worse with nothing.  No specific triggering factors.  His main concern at that he gets diarrhea after eating meals.  He has not been taking Metformin, he just restarted recently.  Initially he was seen in urgent care on 2021 and was sent to acute diagnostic center.  He had CT abdomen showin. No radiographic findings to account for diarrhea or nausea. No  evidence of bowel obstruction or enteritis/colitis.  2. Colonic diverticulosis without evidence of acute diverticulitis.  Patient was given normal saline 1 L bolus and referred to GI.  Labs and ultrasound were ordered.  Stool studies test has not done been yet.  Review of Systems   Constitutional, HEENT, cardiovascular, pulmonary, gi and gu systems are negative, except as otherwise noted.      Objective    /70 (BP Location: Left arm, Patient Position: Sitting, Cuff Size: Adult Regular)   Pulse 78   Temp (!) 96.5  F (35.8  C) (Tympanic)   Ht 1.816 m (5' 11.5\")   Wt 84.4 kg (186 lb)   SpO2 98%   BMI 25.58 kg/m    Body mass index is 25.58 " kg/m .  Physical Exam  Vitals and nursing note reviewed.   Constitutional:       General: He is not in acute distress.     Appearance: Normal appearance. He is not ill-appearing, toxic-appearing or diaphoretic.   Cardiovascular:      Rate and Rhythm: Normal rate and regular rhythm.   Abdominal:      General: Bowel sounds are normal. There is no distension.      Palpations: Abdomen is soft. There is no mass.      Tenderness: There is no abdominal tenderness. There is no guarding or rebound.      Hernia: No hernia is present.   Neurological:      Mental Status: He is alert.

## 2021-12-22 LAB
C DIFF TOX B STL QL: NEGATIVE
LACTOFERRIN STL QL IA: POSITIVE

## 2021-12-23 ENCOUNTER — TELEPHONE (OUTPATIENT)
Dept: FAMILY MEDICINE | Facility: CLINIC | Age: 86
End: 2021-12-23
Payer: COMMERCIAL

## 2021-12-23 LAB — O+P STL MICRO: NEGATIVE

## 2021-12-23 RX ORDER — LEVOFLOXACIN 500 MG/1
500 TABLET, FILM COATED ORAL DAILY
Qty: 5 TABLET | Refills: 0 | Status: SHIPPED | OUTPATIENT
Start: 2021-12-23 | End: 2021-12-28

## 2021-12-23 NOTE — TELEPHONE ENCOUNTER
----- Message from Aida Sellers MD sent at 12/23/2021 12:19 PM CST -----  Please call the patient to let him know his stool test came back positive for Salmonella infection.  He also has some inflammation in the colon likely because of gastroenteritis.  I recommend taking a medication called Levaquin 500 mg daily for 5 days to treat this infection.  Patient sent to his pharmacy  Follow-up with gastroenterology as we discussed in the clinic    Please continue with the plan as we discussed in the clinic.  Feel free to contact the clinic with any questions or concerns. Thank you for allowing us to participate in your care.    Aida Sellers MD.

## 2021-12-23 NOTE — TELEPHONE ENCOUNTER
Attempted to contact patient and left a message to call back [phone].    Lashanda Deluca RN [facility]

## 2021-12-24 NOTE — TELEPHONE ENCOUNTER
Patient notified of provider's message as written below. He was given gastro's number to schedule an appointment. Patient verbalized good understanding, had no further questions and needed no further support.Layla Franco R.N.

## 2021-12-24 NOTE — TELEPHONE ENCOUNTER
Left message on answering machine for patient/parent to call back.   [phone]  Dunia Zamudio RN

## 2021-12-25 LAB — HEMOCCULT STL QL IA: NEGATIVE

## 2021-12-29 ENCOUNTER — TELEPHONE (OUTPATIENT)
Dept: FAMILY MEDICINE | Facility: CLINIC | Age: 86
End: 2021-12-29
Payer: COMMERCIAL

## 2021-12-29 NOTE — TELEPHONE ENCOUNTER
Patient calling requesting confirmation from his prescribing doctor that the medication he picked up will not interact with any of his current medications.     Patient requesting a message sent to his provider and requesting a call back. OK to leave voicemail.    Routing to provider to review when able.      Thank you,  Asuncion Phoenix RN, BSN  [facility]

## 2021-12-29 NOTE — TELEPHONE ENCOUNTER
Patient calling back regarding a message given to him from nurses last week. Message was from 12/24/2021.  Patient requesting clarification on who to call to follow-up. Per , advised patient to follow up with gastroenterology as discussed at previous clinic visit. Provider patient with number to call.    Patient verbalized an understanding, agrees to this plan, and had no further questions.    Asuncion Phoenix RN, BSN  [facility]

## 2022-01-01 ENCOUNTER — LAB REQUISITION (OUTPATIENT)
Dept: LAB | Facility: CLINIC | Age: 87
End: 2022-01-01
Payer: COMMERCIAL

## 2022-01-01 DIAGNOSIS — D64.9 ANEMIA, UNSPECIFIED: ICD-10-CM

## 2022-01-01 DIAGNOSIS — E11.9 TYPE 2 DIABETES MELLITUS WITHOUT COMPLICATIONS (H): ICD-10-CM

## 2022-01-01 DIAGNOSIS — R79.89 OTHER SPECIFIED ABNORMAL FINDINGS OF BLOOD CHEMISTRY: ICD-10-CM

## 2022-01-01 LAB
ANION GAP SERPL CALCULATED.3IONS-SCNC: 13 MMOL/L (ref 7–15)
BUN SERPL-MCNC: 19.4 MG/DL (ref 8–23)
CALCIUM SERPL-MCNC: 9.4 MG/DL (ref 8.8–10.2)
CHLORIDE SERPL-SCNC: 101 MMOL/L (ref 98–107)
CREAT SERPL-MCNC: 1.02 MG/DL (ref 0.67–1.17)
DEPRECATED HCO3 PLAS-SCNC: 26 MMOL/L (ref 22–29)
GFR SERPL CREATININE-BSD FRML MDRD: 72 ML/MIN/1.73M2
GLUCOSE SERPL-MCNC: 130 MG/DL (ref 70–99)
POTASSIUM SERPL-SCNC: 4.5 MMOL/L (ref 3.4–5.3)
SODIUM SERPL-SCNC: 140 MMOL/L (ref 136–145)

## 2022-01-01 PROCEDURE — 36415 COLL VENOUS BLD VENIPUNCTURE: CPT | Performed by: FAMILY MEDICINE

## 2022-01-01 PROCEDURE — 80048 BASIC METABOLIC PNL TOTAL CA: CPT | Performed by: FAMILY MEDICINE

## 2022-01-01 PROCEDURE — P9604 ONE-WAY ALLOW PRORATED TRIP: HCPCS | Performed by: FAMILY MEDICINE

## 2022-01-03 NOTE — TELEPHONE ENCOUNTER
He should start taking the antibiotics   Check with pharmacy for any question about drug-drug integration   Aida Sellers MD.

## 2022-01-03 NOTE — TELEPHONE ENCOUNTER
Patient called again wanting to make sure he can start levofloxacin, requesting call back to confirm it won't interfere with other medications.

## 2022-01-03 NOTE — TELEPHONE ENCOUNTER
Called patient and discussed message from Dr. Sellers.    He agrees to plan.    Amanda Brown RN, [facility]

## 2022-01-15 ENCOUNTER — TRANSFERRED RECORDS (OUTPATIENT)
Dept: MULTI SPECIALTY CLINIC | Facility: CLINIC | Age: 87
End: 2022-01-15
Payer: COMMERCIAL

## 2022-01-15 LAB — RETINOPATHY: NEGATIVE

## 2022-02-14 ENCOUNTER — NURSE TRIAGE (OUTPATIENT)
Dept: FAMILY MEDICINE | Facility: CLINIC | Age: 87
End: 2022-02-14
Payer: COMMERCIAL

## 2022-02-14 NOTE — TELEPHONE ENCOUNTER
I did not see the patient for this and my recommendation would be for his to be seen in clinic again. I will forward to the treating provider.

## 2022-02-14 NOTE — TELEPHONE ENCOUNTER
Patient is calling to get another Rx for levofloxacin (LEVAQUIN), his diarrhea has come back.  Please send it to the [facility] pharmacy.  Please call patient when and if it has been sent to the pharmacy.  Thank you

## 2022-02-15 ENCOUNTER — OFFICE VISIT (OUTPATIENT)
Dept: FAMILY MEDICINE | Facility: CLINIC | Age: 87
End: 2022-02-15
Payer: COMMERCIAL

## 2022-02-15 VITALS
HEIGHT: 72 IN | TEMPERATURE: 97.1 F | SYSTOLIC BLOOD PRESSURE: 119 MMHG | HEART RATE: 89 BPM | BODY MASS INDEX: 25.47 KG/M2 | DIASTOLIC BLOOD PRESSURE: 72 MMHG | WEIGHT: 188 LBS | OXYGEN SATURATION: 97 %

## 2022-02-15 DIAGNOSIS — R19.7 DIARRHEA, UNSPECIFIED TYPE: ICD-10-CM

## 2022-02-15 DIAGNOSIS — A02.9 SALMONELLA: Primary | ICD-10-CM

## 2022-02-15 PROCEDURE — 99213 OFFICE O/P EST LOW 20 MIN: CPT | Performed by: NURSE PRACTITIONER

## 2022-02-15 RX ORDER — LEVOFLOXACIN 500 MG/1
500 TABLET, FILM COATED ORAL DAILY
Qty: 5 TABLET | Refills: 0 | Status: SHIPPED | OUTPATIENT
Start: 2022-02-15 | End: 2022-02-20

## 2022-02-15 ASSESSMENT — MIFFLIN-ST. JEOR: SCORE: 1567.82

## 2022-02-15 NOTE — PROGRESS NOTES
"  Assessment & Plan     Salmonella  Per up to date, treatment duration is 3-14 days.  He had improvement with 5 days of treatment, but symptoms returned after 1 week.  No pain, fever, blood in stool, eating okay.    Will treat with Levaquin for another 5 days.  Advised if his symptoms are not resolving, needs further work-up and possible referral to GI.  Patient and son report understanding.   - levofloxacin (LEVAQUIN) 500 MG tablet; Take 1 tablet (500 mg) by mouth daily for 5 days    Diarrhea, unspecified type  See above.   - levofloxacin (LEVAQUIN) 500 MG tablet; Take 1 tablet (500 mg) by mouth daily for 5 days       BMI:   Estimated body mass index is 25.86 kg/m  as calculated from the following:    Height as of this encounter: 1.816 m (5' 11.5\").    Weight as of this encounter: 85.3 kg (188 lb).           Return in about 2 weeks (around 3/1/2022) for If failure to improve, or sooner if worsening.    Lilly Gardner, [employer]   Olegario is a 86 year old who presents for the following health issues     HPI       Here with son for diarrhea concerns.   Was dealing with diarrhea in December 2021, stool sample was positive for salmonella. Levaquin x 5 days prescribed on 12/23, but he didn't start until 1/3 because he was afraid to take it at first.  Diarrhea went away and he felt better for about 1 week after antibiotics.  Then diarrhea returned. He states he has continued to have diarrhea for the last month.    States 4-5 times per day, usually after eating.   No blood in stool  No abdominal pain  No fever  No nausea or vomiting.     He thinks he needs more antibiotics.      To note, prior to stool studies he had a CT of his abdomen and pelvis on 12/8/21 for his diarrhea symptoms that was negative for signs of colitis or diverticulitis.      Review of Systems   Constitutional, HEENT, cardiovascular, pulmonary, gi and gu systems are negative, except as otherwise noted.    " "  Objective    /72   Pulse 89   Temp 97.1  F (36.2  C)   Ht 1.816 m (5' 11.5\")   Wt 85.3 kg (188 lb)   SpO2 97%   BMI 25.86 kg/m    Body mass index is 25.86 kg/m .  Physical Exam   GENERAL: healthy, alert and no distress  NECK: no adenopathy, no asymmetry, masses, or scars and thyroid normal to palpation  RESP: lungs clear to auscultation - no rales, rhonchi or wheezes  CV: regular rate and rhythm, normal S1 S2, no S3 or S4, no murmur, click or rub, no peripheral edema and peripheral pulses strong  ABDOMEN: soft, nontender, no hepatosplenomegaly, no masses and bowel sounds normal  MS: no gross musculoskeletal defects noted, no edema  SKIN: no suspicious lesions or rashes  NEURO: Normal strength and tone, mentation intact and speech normal    Labs and imaging reviewed in Epic.             "

## 2022-02-15 NOTE — PATIENT INSTRUCTIONS
We will treat you with an additional 5 days of antibiotics.  Take levaquin 500 mg once daily for 5 days.  Make sure you are drinking plenty of fluids.   If your diarrhea doesn't go away, or goes away and comes back, we should recheck your stool samples and possibly get you in with the gastroenterology.       Make an appointment to establish care with someone who will be your primary provider.     
2.02

## 2022-02-15 NOTE — TELEPHONE ENCOUNTER
He has had diarrhea 5 times a day. He can urinate at least every 8 hours. He does not have abdominal pain or fever.  He states that he feels weak, but can take care of himself and has been since his last infection. There is no blood in his stools.     Nursing advice:  Patient to be seen in clinic today for assessment. He was assisted in making the appointment as listed below. Patient verbalizes good understanding, agrees with plan and states he needs no further support. Layla Franco R.N.    Next 5 appointments (look out 90 days)    Feb 15, 2022  2:30 PM  (Arrive by 2:10 PM)  Provider Visit with Lilly Gardner CNP  [facility] ([facility] ) [address] [facility] [address] [phone]          Reason for Disposition    MODERATE diarrhea (e.g., 4-6 times / day more than normal) and present > 48 hours (2 days)    Additional Information    Negative: Shock suspected (e.g., cold/pale/clammy skin, too weak to stand, low BP, rapid pulse)    Negative: Difficult to awaken or acting confused (e.g., disoriented, slurred speech)    Negative: Sounds like a life-threatening emergency to the triager    Negative: SEVERE abdominal pain (e.g., excruciating) and present > 1 hour    Negative: SEVERE abdominal pain and age > 60    Negative: Bloody, black, or tarry bowel movements (Exception: chronic-unchanged black-grey bowel movements and is taking iron pills or Pepto-bismol)    Negative: SEVERE diarrhea (e.g., 7 or more times / day more than normal) and age > 60 years    Negative: Constant abdominal pain lasting > 2 hours    Negative: Drinking very little and has signs of dehydration (e.g., no urine > 12 hours, very dry mouth, very lightheaded)    Negative: Patient sounds very sick or weak to the triager    Negative: SEVERE diarrhea (e.g., 7 or more times / day more than normal) and present > 24 hours (1 day)    Protocols used: DIARRHEA-A-OH

## 2022-03-09 ENCOUNTER — OFFICE VISIT (OUTPATIENT)
Dept: FAMILY MEDICINE | Facility: CLINIC | Age: 87
End: 2022-03-09
Payer: COMMERCIAL

## 2022-03-09 ENCOUNTER — DOCUMENTATION ONLY (OUTPATIENT)
Dept: LAB | Facility: CLINIC | Age: 87
End: 2022-03-09

## 2022-03-09 VITALS
OXYGEN SATURATION: 98 % | SYSTOLIC BLOOD PRESSURE: 110 MMHG | BODY MASS INDEX: 25.33 KG/M2 | HEART RATE: 89 BPM | HEIGHT: 72 IN | TEMPERATURE: 96.8 F | DIASTOLIC BLOOD PRESSURE: 69 MMHG | WEIGHT: 187 LBS

## 2022-03-09 DIAGNOSIS — E11.42 TYPE 2 DIABETES MELLITUS WITH DIABETIC POLYNEUROPATHY, WITHOUT LONG-TERM CURRENT USE OF INSULIN (H): ICD-10-CM

## 2022-03-09 DIAGNOSIS — K21.9 GASTROESOPHAGEAL REFLUX DISEASE, UNSPECIFIED WHETHER ESOPHAGITIS PRESENT: ICD-10-CM

## 2022-03-09 DIAGNOSIS — M25.552 CHRONIC HIP PAIN, BILATERAL: Primary | ICD-10-CM

## 2022-03-09 DIAGNOSIS — G89.29 CHRONIC HIP PAIN, BILATERAL: Primary | ICD-10-CM

## 2022-03-09 DIAGNOSIS — M25.551 CHRONIC HIP PAIN, BILATERAL: Primary | ICD-10-CM

## 2022-03-09 DIAGNOSIS — N18.32 STAGE 3B CHRONIC KIDNEY DISEASE (H): ICD-10-CM

## 2022-03-09 DIAGNOSIS — F51.01 PRIMARY INSOMNIA: ICD-10-CM

## 2022-03-09 DIAGNOSIS — I51.89 DIASTOLIC DYSFUNCTION: ICD-10-CM

## 2022-03-09 DIAGNOSIS — I10 HYPERTENSION GOAL BP (BLOOD PRESSURE) < 140/90: ICD-10-CM

## 2022-03-09 DIAGNOSIS — I25.10 CORONARY ARTERY DISEASE INVOLVING NATIVE CORONARY ARTERY OF NATIVE HEART WITHOUT ANGINA PECTORIS: ICD-10-CM

## 2022-03-09 DIAGNOSIS — Z86.73 HISTORY OF CEREBROVASCULAR ACCIDENT: ICD-10-CM

## 2022-03-09 DIAGNOSIS — I65.22 STENOSIS OF LEFT CAROTID ARTERY: ICD-10-CM

## 2022-03-09 LAB
ANION GAP SERPL CALCULATED.3IONS-SCNC: 5 MMOL/L (ref 3–14)
BUN SERPL-MCNC: 16 MG/DL (ref 7–30)
CALCIUM SERPL-MCNC: 9.4 MG/DL (ref 8.5–10.1)
CHLORIDE BLD-SCNC: 104 MMOL/L (ref 94–109)
CHOLEST SERPL-MCNC: 119 MG/DL
CO2 SERPL-SCNC: 30 MMOL/L (ref 20–32)
CREAT SERPL-MCNC: 1.32 MG/DL (ref 0.66–1.25)
FASTING STATUS PATIENT QL REPORTED: NO
GFR SERPL CREATININE-BSD FRML MDRD: 53 ML/MIN/1.73M2
GLUCOSE BLD-MCNC: 160 MG/DL (ref 70–99)
HBA1C MFR BLD: 7.8 % (ref 0–5.6)
HDLC SERPL-MCNC: 50 MG/DL
LDLC SERPL CALC-MCNC: 46 MG/DL
NONHDLC SERPL-MCNC: 69 MG/DL
POTASSIUM BLD-SCNC: 4.7 MMOL/L (ref 3.4–5.3)
SODIUM SERPL-SCNC: 139 MMOL/L (ref 133–144)
TRIGL SERPL-MCNC: 117 MG/DL

## 2022-03-09 PROCEDURE — 80061 LIPID PANEL: CPT | Performed by: NURSE PRACTITIONER

## 2022-03-09 PROCEDURE — 36415 COLL VENOUS BLD VENIPUNCTURE: CPT | Performed by: NURSE PRACTITIONER

## 2022-03-09 PROCEDURE — 80048 BASIC METABOLIC PNL TOTAL CA: CPT | Performed by: NURSE PRACTITIONER

## 2022-03-09 PROCEDURE — 99214 OFFICE O/P EST MOD 30 MIN: CPT | Performed by: NURSE PRACTITIONER

## 2022-03-09 PROCEDURE — 83036 HEMOGLOBIN GLYCOSYLATED A1C: CPT | Performed by: NURSE PRACTITIONER

## 2022-03-09 ASSESSMENT — PAIN SCALES - GENERAL: PAINLEVEL: WORST PAIN (10)

## 2022-03-09 NOTE — LETTER
March 11, 2022      Olegario RASHIDA Packer  [address] [facility] [address]        Olegario,     Your labs are stable.  Please follow-up in 6 months.  Sooner if concerns.  I sent refills on all of your medications.     Lilly Gardner CNP       Resulted Orders   Lipid panel reflex to direct LDL Fasting   Result Value Ref Range    Cholesterol 119 <200 mg/dL    Triglycerides 117 <150 mg/dL    Direct Measure HDL 50 >=40 mg/dL    LDL Cholesterol Calculated 46 <=100 mg/dL    Non HDL Cholesterol 69 <130 mg/dL    Patient Fasting > 8hrs? No     Narrative    Cholesterol  Desirable:  <200 mg/dL    Triglycerides  Normal:  Less than 150 mg/dL  Borderline High:  150-199 mg/dL  High:  200-499 mg/dL  Very High:  Greater than or equal to 500 mg/dL    Direct Measure HDL  Female:  Greater than or equal to 50 mg/dL   Male:  Greater than or equal to 40 mg/dL    LDL Cholesterol  Desirable:  <100mg/dL  Above Desirable:  100-129 mg/dL   Borderline High:  130-159 mg/dL   High:  160-189 mg/dL   Very High:  >= 190 mg/dL    Non HDL Cholesterol  Desirable:  130 mg/dL  Above Desirable:  130-159 mg/dL  Borderline High:  160-189 mg/dL  High:  190-219 mg/dL  Very High:  Greater than or equal to 220 mg/dL   HEMOGLOBIN A1C   Result Value Ref Range    Hemoglobin A1C 7.8 (H) 0.0 - 5.6 %      Comment:      Normal <5.7%   Prediabetes 5.7-6.4%    Diabetes 6.5% or higher     Note: Adopted from ADA consensus guidelines.   Basic metabolic panel  (Ca, Cl, CO2, Creat, Gluc, K, Na, BUN)   Result Value Ref Range    Sodium 139 133 - 144 mmol/L    Potassium 4.7 3.4 - 5.3 mmol/L    Chloride 104 94 - 109 mmol/L    Carbon Dioxide (CO2) 30 20 - 32 mmol/L    Anion Gap 5 3 - 14 mmol/L    Urea Nitrogen 16 7 - 30 mg/dL    Creatinine 1.32 (H) 0.66 - 1.25 mg/dL    Calcium 9.4 8.5 - 10.1 mg/dL    Glucose 160 (H) 70 - 99 mg/dL    GFR Estimate 53 (L) >60 mL/min/1.73m2      Comment:      Effective December 21, 2021 eGFRcr in adults is calculated using the 2021  CKD-EPI creatinine equation which includes age and gender (Juan haley al., NEJM, DOI: 10.1056/TRXLhg0667031)

## 2022-03-09 NOTE — PROGRESS NOTES
Assessment & Plan     Chronic hip pain, bilateral  Wants hip injections, last done 9/24/21 @ [facility].    - Orthopedic  Referral; Future    Type 2 diabetes mellitus with diabetic polyneuropathy, without long-term current use of insulin (H)  Improved from 8.4-->7.8.  Goal should be less than 8 given age and comorbidities.  Continue current medications without change.  Follow-up in 6 months, sooner if concerns.  - Lipid panel reflex to direct LDL Fasting  - HEMOGLOBIN A1C  - Basic metabolic panel  (Ca, Cl, CO2, Creat, Gluc, K, Na, BUN)  - Albumin Random Urine Quantitative with Creat Ratio; Future  - metFORMIN (GLUCOPHAGE) 500 MG tablet; Take 1 tablet (500 mg) by mouth 2 times daily (with meals)  - atorvastatin (LIPITOR) 20 MG tablet; Take 1 tablet (20 mg) by mouth daily    Hypertension goal BP (blood pressure) < 140/90  Chronic, stable. Continue current medications.  - Basic metabolic panel  (Ca, Cl, CO2, Creat, Gluc, K, Na, BUN)  - metoprolol succinate ER (TOPROL-XL) 25 MG 24 hr tablet; Take 1 tablet (25 mg) by mouth daily    Stage 3b chronic kidney disease (H)  Chronic, stable. Not on ACE/ARB per cardiology notes due to issues with hypotension.  - Basic metabolic panel  (Ca, Cl, CO2, Creat, Gluc, K, Na, BUN)    Diastolic dysfunction  Chronic, stable.  Denies symptoms.  Not on ACE/ARB per cardiology notes due to issues with hypotension.  I do not believe he has had follow-up with cardiology since 2020, I am going to ask that he follow-up with them in office.  - ACE/ARB/ARNI NOT PRESCRIBED (INTENTIONAL); Please choose reason not prescribed from choices below.    Coronary artery disease involving native coronary artery of native heart without angina pectoris  Denies any exertional symptoms.  As above, I am going to ask that he follow-up with cardiology as he has not been seen for a few years.  - atorvastatin (LIPITOR) 20 MG tablet; Take 1 tablet (20 mg) by mouth daily  - clopidogrel (PLAVIX) 75 MG tablet; Take 1  "tablet (75 mg) by mouth daily  - metoprolol succinate ER (TOPROL-XL) 25 MG 24 hr tablet; Take 1 tablet (25 mg) by mouth daily    Stenosis of left carotid artery  Chronic, stable.  Denies symptoms. S/p CEA. Continue current medications.  - atorvastatin (LIPITOR) 20 MG tablet; Take 1 tablet (20 mg) by mouth daily  - clopidogrel (PLAVIX) 75 MG tablet; Take 1 tablet (75 mg) by mouth daily    History of cerebrovascular accident  Chronic, stable.  Denies symptoms.      - atorvastatin (LIPITOR) 20 MG tablet; Take 1 tablet (20 mg) by mouth daily  - clopidogrel (PLAVIX) 75 MG tablet; Take 1 tablet (75 mg) by mouth daily    Primary insomnia  Chronic, stable.  Continue trazodone, denies side effects.   - traZODone (DESYREL) 50 MG tablet; Take 1 tablet (50 mg) by mouth At Bedtime    Gastroesophageal reflux disease, unspecified whether esophagitis present  Chronic, stable.  Continue omeprazole.     - omeprazole (PRILOSEC) 20 MG DR capsule; Take 1 capsule (20 mg) by mouth daily       BMI:   Estimated body mass index is 25.72 kg/m  as calculated from the following:    Height as of this encounter: 1.816 m (5' 11.5\").    Weight as of this encounter: 84.8 kg (187 lb).       Return in about 3 months (around 6/9/2022) for Follow-up.    Lilly Gardner, [facility] BRISA Melvin is a 87 year old who presents for the following health issues  accompanied by his son.    HPI       Patient is here with his son.  His main concern today is that he wants injections in his bilateral hips.  He reports a history of chronic hip pain and osteoarthritis.  He was previously going to the [facility], and would get injections done at [facility].  I reviewed his care everywhere chart and it appears he last had injections in bilateral hips on 924/2021.  He had a bilateral hip x-ray on 9/16/2021, see below.    Bilateral hip 9/16/2021  IMPRESSION: Mild narrowing and hypertrophic change in the left hip " joint. Mild hypertrophic changes in the right hip joint without joint space narrowing. Degenerative changes in the SI joints and lower lumbar spine.    Patient has a history of type 2 diabetes, hypertension, hyperlipidemia, CKD stage III, complete heart block status post PPM, MARKUS s/p CEA left ICA, CVA, CAD, TWAN and neuropathy.  He plans to continue to follow at this clinic.  He is due for labs today.  He denies any chest pain, shortness of breath or lower extremity edema.  He does not seem to familiar with his medications, but they match what is on his chart at [facility].  Son would like his prescriptions filled at the clinic pharmacy since it is convenient for him to .   Patient has device interrogations, but appears he hasn't actually seen cardiology since 2020.     Echo 12/27/2017   Final Conclusion    Technically difficult study.    Mildly abnormal left ventricular ejection fraction estimated at 40-45%.    Hypokinesis of the distal septal, distal anterior and apical segment.    Catheter/device wire noted in the right ventricular cavity.    Normal left ventricular wall thickness.    Mild aortic stenosis with mean aortic transvalvular gradient is 9 mmHg and calculated aortic   valve area is 1.5 cm2.    Mild mitral regurgitation.    Trace tricuspid regurgitation.    Trace pulmonic regurgitation.    When compared to the previous echocardiographic report of 10/26/2016, there has been no   significant change.    Estimated EF: 40-45%     NM stress test 12/6/2019   FINAL CONCLUSIONS    There is a small fixed apical defect of moderate intensity on stress images when compared to   rest images.  There is no significant    evidence of ischemia. There is no significant TID noted. Findings consistent with possible   prior myocardial infection.  This is new    compared to the last study in 2016.    Normal left ventricular size and systolic function with a calculated LVEF of greater than 70%.     US Carotid 5/9/2018   FINAL  "CONCLUSIONS Previous Study: 01/06/2017    1. Less than 50% stenosis in the right internal carotid artery.    2. Moderate 50-69% stenosis in the left internal carotid artery, s/p left CEA.    3. Antegrade flow in both vertebral arteries.    4. Compared to prior study of 01/06/2017, there is no significant change.       Review of Systems   Constitutional, HEENT, cardiovascular, pulmonary, gi and gu systems are negative, except as otherwise noted.      Objective    /69   Pulse 89   Temp 96.8  F (36  C)   Ht 1.816 m (5' 11.5\")   Wt 84.8 kg (187 lb)   SpO2 98%   BMI 25.72 kg/m    Body mass index is 25.72 kg/m .  Physical Exam   GENERAL: healthy, alert and no distress  NECK: no adenopathy, no asymmetry, masses, or scars and thyroid normal to palpation  RESP: lungs clear to auscultation - no rales, rhonchi or wheezes  CV: regular rate and rhythm, normal S1 S2, no S3 or S4, no murmur, click or rub, no peripheral edema and peripheral pulses strong  ABDOMEN: soft, nontender, no hepatosplenomegaly, no masses and bowel sounds normal  MS: no gross musculoskeletal defects noted, no edema  SKIN: no suspicious lesions or rashes  NEURO: Normal strength and tone, mentation intact and speech normal    Labs reviewed in Care Everywhere      Lab Results   Component Value Date    A1C 7.8 03/09/2022    A1C 8.3 12/08/2021    A1C 6.2 07/29/2010     Recent Labs   Lab Test 03/09/22  1451 12/08/21  1433    139   POTASSIUM 4.7 3.9   CHLORIDE 104 107   CO2 30 27   ANIONGAP 5 5   * 173*   BUN 16 17   CR 1.32* 1.06   REBECA 9.4 9.2     Recent Labs   Lab Test 03/09/22  1451   CHOL 119   HDL 50   LDL 46   TRIG 117                 "

## 2022-03-09 NOTE — PROGRESS NOTES
Just an FYI, Olegario was unable to void today.  I cancelled the urine Albumin and placed a future order.  He will return to clinic to give a urine sample at a later date.    Meryl Davis on 3/9/2022 at 4:31 PM

## 2022-03-10 PROBLEM — I42.8 OTHER CARDIOMYOPATHIES (H): Status: RESOLVED | Noted: 2022-03-10 | Resolved: 2022-03-10

## 2022-03-10 PROBLEM — I42.8 OTHER CARDIOMYOPATHIES (H): Status: ACTIVE | Noted: 2022-03-10

## 2022-03-10 PROBLEM — I44.2 COMPLETE HEART BLOCK (H): Status: RESOLVED | Noted: 2021-12-08 | Resolved: 2022-03-10

## 2022-03-10 PROBLEM — I63.9 STROKE (H): Status: RESOLVED | Noted: 2021-12-08 | Resolved: 2022-03-10

## 2022-03-10 RX ORDER — TRAZODONE HYDROCHLORIDE 50 MG/1
50 TABLET, FILM COATED ORAL AT BEDTIME
Qty: 90 TABLET | Refills: 3 | Status: SHIPPED | OUTPATIENT
Start: 2022-03-10

## 2022-03-10 RX ORDER — METOPROLOL SUCCINATE 25 MG/1
25 TABLET, EXTENDED RELEASE ORAL DAILY
Qty: 90 TABLET | Refills: 3 | Status: SHIPPED | OUTPATIENT
Start: 2022-03-10

## 2022-03-10 RX ORDER — CLOPIDOGREL BISULFATE 75 MG/1
75 TABLET ORAL DAILY
Qty: 90 TABLET | Refills: 0 | Status: SHIPPED | OUTPATIENT
Start: 2022-03-10

## 2022-03-10 RX ORDER — ATORVASTATIN CALCIUM 20 MG/1
20 TABLET, FILM COATED ORAL DAILY
Qty: 90 TABLET | Refills: 3 | Status: SHIPPED | OUTPATIENT
Start: 2022-03-10

## 2022-03-10 NOTE — PROGRESS NOTES
ASSESSMENT & PLAN    Olegario was seen today for pain and pain.    Diagnoses and all orders for this visit:    Chronic hip pain, bilateral  -     Orthopedic  Referral  -     Large Joint Injection/Arthocentesis: bilateral hip joint    Chronic bilateral low back pain without sciatica  -     Orthopedic  Referral  -     Large Joint Injection/Arthocentesis: bilateral hip joint      This issue is acute on chronic and Worsening.    # Chronic Bilateral Hip and Low Back Pain: Longstanding condition for patient over the past 4+ years.  He is having pain in the lower back bilaterally without pain going down his legs.  There is no significant pain over the joints but he reports feeling tightness and limited ability to stand upright.  He reports having hip injections on 9/24/21 and that allowed him to stand upright afterwards.  He is hoping to get a repeat of this.  Patient is restricted into a lumbar flexed position unable to fully stand up right on examination.  There is no significant pain with palpation in the hips or lower back with intact strength in the lower extremities bilaterally.  Upon reviewing previous interactions he reports previous epidurals or injections have not been helpful.  Reviewed outside imaging including lumbar and hip x-rays showing degenerative or arthritic changes in both sides.  Given his gotten relief from this before we will repeat this and follow-up in 2 to 3 weeks if not.  Can consider possible trigger point versus epidural steroid injections though he would have to hold Plavix for this.  Plan otherwise as below  Image Findings: Reviewed outside x-rays of the hips and low back showing arthritis in both sides  Treatment: Activities as tolerated, home exercises given today  Medications/Injections: Limited tylenol/ibuprofen for pain for 1-2 weeks, bilateral hip steroid injections  Follow-up: In one month if symptoms do not improve, sooner if worsening  Can consider evaluation of low  "back pain         Grey Maldonado MD  [facility] [place]    -----  Chief Complaint   Patient presents with     Right Hip - Pain     Left Hip - Pain       SUBJECTIVE  Olegario Packer is a/an 87 year old male who is seen in consultation at the request of  Lilly Gardner C.N.P. for evaluation of bilateral hip pain.     The patient is seen by themselves.    Onset: Chronic for many years.  Location of Pain: bilateral lower back and radiates proximally up the back.   Worsened by: standing upright, ambulating and getting out of bed.  Better with: both KATHRYN and IA corticosteroid injections both allowed for him to \"walk more upright\"  Treatments tried: corticosteroid injections, lidocaine patches  Associated symptoms: no distal numbness or tingling; denies swelling or warmth    Orthopedic/Surgical history: Chronic back pain  Social History/Occupation: retired    No family history pertinent to patient's problem today.      REVIEW OF SYSTEMS:  Review of Systems  Constitutional, HEENT, cardiovascular, pulmonary, GI, , musculoskeletal, neuro, skin, endocrine and psych systems are negative, except as otherwise noted.    OBJECTIVE:  /58   Wt 84.8 kg (187 lb)   BMI 25.72 kg/m     General: healthy, alert and in no distress  HEENT: no scleral icterus or conjunctival erythema  Skin: no suspicious lesions or rash. No jaundice.  CV: distal perfusion intact    Resp: normal respiratory effort without conversational dyspnea   Psych: normal mood and affect  Gait: normal steady gait with appropriate coordination and balance    Neuro: Normal light sensory exam of bilateral lower extremities    BILATERAL HIP  Inspection:    No swelling, bruising, discoloration, or obvious deformity or asymmetry  Palpation:    Mild TTP about the anterior groin/joint line. Otherwise all other landmarks are nontender.    Crepitus is Absent  Active Range of Motion:     Flexion full, extension full / IR limited by " tightness / ER  limited by tightness  Strength:    Flexion 5/5 / extension 5/5 / adduction 5/5 / abduction 5/5  Special Tests:    Positive: None    Negative: Logroll, FRANKO, anterior impingement (FADIR)    THORACIC/LUMBAR SPINE  Inspection:  Patient in fixed lumbar flexion.  Palpation:  Nontender.  Range of Motion:     Lumbar flexion limited by tightness    Lumbar extension limited by tightness    Right side bend limited by tightness    Left side bend limited by tightness    Right rotation limited by tightness    Left rotation limited by tightness  Strength:    5/5 - quadriceps, hamstrings, tibialis anterior, gastrocsoleus, and extensor hallicus longus  Special Tests:    Positive: None  Negative: straight leg raise (bilateral), slump test (bilateral)      RADIOLOGY:  I independently, visualized and reviewed these images with the patient  Outside hip and spine x-rays and hip/spine MRIs    Large Joint Injection/Arthocentesis: bilateral hip joint    Date/Time: 3/17/2022 6:00 PM  Performed by: Grey Maldonado MD  Authorized by: Grey Maldonado MD     Indications:  Pain and osteoarthritis  Needle Size:  22 G  Guidance: ultrasound    Approach:  Anterior  Location:  Hip  Laterality:  Bilateral      Site:  Bilateral hip joint  Medications (Right):  6 mg betamethasone acet & sod phos 6 (3-3) MG/ML; 2 mL ropivacaine 5 MG/ML  Medications (Left):  6 mg betamethasone acet & sod phos 6 (3-3) MG/ML; 2 mL ropivacaine 5 MG/ML  Outcome:  Tolerated well, no immediate complications  Procedure discussed: discussed risks, benefits, and alternatives    Consent Given by:  Patient  Timeout: timeout called immediately prior to procedure    Prep: patient was prepped and draped in usual sterile fashion     Ultrasound images of procedure were permanently stored.     Patient reported some improvement of pain after the numbing portion of bilateral hip joint steroid injection.  Ultrasound guided images were permanently stored.   Aftercare  instructions given to patient.  Plan to follow-up as discussed above.     Grey Maldonado MD [facility]           Review of external notes as documented elsewhere in note  Review of the result(s) of each unique test - bilateral hip/spine x-rays/MRIs at outside facility, reviewed outside PCP and ortho notes       Disclaimer: This note consists of symbols derived from keyboarding, dictation and/or voice recognition software. As a result, there may be errors in the script that have gone undetected. Please consider this when interpreting information found in this chart.

## 2022-03-17 ENCOUNTER — OFFICE VISIT (OUTPATIENT)
Dept: ORTHOPEDICS | Facility: CLINIC | Age: 87
End: 2022-03-17
Attending: NURSE PRACTITIONER
Payer: COMMERCIAL

## 2022-03-17 VITALS — SYSTOLIC BLOOD PRESSURE: 104 MMHG | DIASTOLIC BLOOD PRESSURE: 58 MMHG | WEIGHT: 187 LBS | BODY MASS INDEX: 25.72 KG/M2

## 2022-03-17 DIAGNOSIS — G89.29 CHRONIC HIP PAIN, BILATERAL: Primary | ICD-10-CM

## 2022-03-17 DIAGNOSIS — M25.552 CHRONIC HIP PAIN, BILATERAL: Primary | ICD-10-CM

## 2022-03-17 DIAGNOSIS — G89.29 CHRONIC BILATERAL LOW BACK PAIN WITHOUT SCIATICA: ICD-10-CM

## 2022-03-17 DIAGNOSIS — M54.50 CHRONIC BILATERAL LOW BACK PAIN WITHOUT SCIATICA: ICD-10-CM

## 2022-03-17 DIAGNOSIS — M25.551 CHRONIC HIP PAIN, BILATERAL: Primary | ICD-10-CM

## 2022-03-17 PROCEDURE — 99204 OFFICE O/P NEW MOD 45 MIN: CPT | Mod: 25 | Performed by: FAMILY MEDICINE

## 2022-03-17 PROCEDURE — 20611 DRAIN/INJ JOINT/BURSA W/US: CPT | Mod: 50 | Performed by: FAMILY MEDICINE

## 2022-03-17 RX ADMIN — ROPIVACAINE HYDROCHLORIDE 2 ML: 5 INJECTION, SOLUTION EPIDURAL; INFILTRATION; PERINEURAL at 18:00

## 2022-03-17 RX ADMIN — BETAMETHASONE SODIUM PHOSPHATE AND BETAMETHASONE ACETATE 6 MG: 3; 3 INJECTION, SUSPENSION INTRA-ARTICULAR; INTRALESIONAL; INTRAMUSCULAR; SOFT TISSUE at 18:00

## 2022-03-17 ASSESSMENT — PAIN SCALES - GENERAL: PAINLEVEL: SEVERE PAIN (7)

## 2022-03-17 NOTE — PATIENT INSTRUCTIONS
# Chronic Bilateral Hip and Low Back Pain: Longstanding condition for patient over the past 4+ years.  He is having pain in the lower back bilaterally without pain going down his legs.  There is no significant pain over the joints but he reports feeling tightness and limited ability to stand upright.  He reports having hip injections on 9/24/2 and that allowed him to stand upright afterwards.  He is hoping to get a repeat of this.  Patient is restricted into a lumbar flexed position unable to fully stand up right on examination.  There is no significant pain with palpation in the hips or lower back with intact strength in the lower extremities bilaterally.  Upon reviewing previous interactions he reports previous epidurals or injections have not been helpful.  Reviewed outside imaging including lumbar and hip x-rays showing degenerative or arthritic changes in both sides.  Given his gotten relief from this before we will repeat this and follow-up in 2 to 3 weeks if not.  Can consider possible trigger point versus epidural steroid injections though he would have to hold Plavix for this.  Plan otherwise as below  Image Findings: Reviewed outside x-rays of the hips and low back showing arthritis in both sides  Treatment: Activities as tolerated, home exercises given today  Medications/Injections: Limited tylenol/ibuprofen for pain for 1-2 weeks, bilateral hip steroid injections  Follow-up: In one month if symptoms do not improve, sooner if worsening  Can consider evaluation of low back pain    Please call [phone]   Ask for my team if you have any questions or concerns    If you have not yet received the influenza vaccine but would like to get one, please call  [phone] or you can schedule via [employer]    It was great seeing you today!    Grey Maldonado MD, CAQSM     FSOC Injection Discharge Instructions    Procedure: Bilateral hip joint steroid injections       You may shower, however avoid swimming, tub  baths or hot tubs for 24 hours following your procedure    You may have a mild to moderate increase in pain for several days following the injection.    It may take up to 14 days for the steroid medication to start working although you may feel the effect as early as a few days after the procedure.    You may use ice packs for 10-15 minutes, 3 to 4 times a day at the injection site for comfort    You may use anti-inflammatory medications (such as Ibuprofen or Aleve or Advil) or Tylenol for pain control if necessary    If you were fasting, you may resume your normal diet and medications after the procedure    If you have diabetes, check your blood sugar more frequently than usual as your blood sugar may be higher than normal for 10-14 days following a steroid injection. Contact your doctor who manages your diabetes if your blood sugar is higher than usual      If you experience any of the following, call [facility] @ [phone] or [phone]  -Fever over 100 degree F  -Swelling, bleeding, redness, drainage, warmth at the injection site  - New or worsening pain

## 2022-03-17 NOTE — LETTER
3/17/2022         RE: Olegario Packer  [address] [address]        Dear Colleague,    Thank you for referring your patient, Olegario Packer, to the [facility]. Please see a copy of my visit note below.    ASSESSMENT & PLAN    Olegario was seen today for pain and pain.    Diagnoses and all orders for this visit:    Chronic hip pain, bilateral  -     Orthopedic  Referral  -     Large Joint Injection/Arthocentesis: bilateral hip joint    Chronic bilateral low back pain without sciatica  -     Orthopedic  Referral  -     Large Joint Injection/Arthocentesis: bilateral hip joint      This issue is acute on chronic and Worsening.    # Chronic Bilateral Hip and Low Back Pain: Longstanding condition for patient over the past 4+ years.  He is having pain in the lower back bilaterally without pain going down his legs.  There is no significant pain over the joints but he reports feeling tightness and limited ability to stand upright.  He reports having hip injections on 9/24/21 and that allowed him to stand upright afterwards.  He is hoping to get a repeat of this.  Patient is restricted into a lumbar flexed position unable to fully stand up right on examination.  There is no significant pain with palpation in the hips or lower back with intact strength in the lower extremities bilaterally.  Upon reviewing previous interactions he reports previous epidurals or injections have not been helpful.  Reviewed outside imaging including lumbar and hip x-rays showing degenerative or arthritic changes in both sides.  Given his gotten relief from this before we will repeat this and follow-up in 2 to 3 weeks if not.  Can consider possible trigger point versus epidural steroid injections though he would have to hold Plavix for this.  Plan otherwise as below  Image Findings: Reviewed outside x-rays of the hips and low back showing arthritis in both  "sides  Treatment: Activities as tolerated, home exercises given today  Medications/Injections: Limited tylenol/ibuprofen for pain for 1-2 weeks, bilateral hip steroid injections  Follow-up: In one month if symptoms do not improve, sooner if worsening  Can consider evaluation of low back pain         Grey Maldonado MD  [facility] SPORTS MEDICINE [facility] [place]    -----  Chief Complaint   Patient presents with     Right Hip - Pain     Left Hip - Pain       SUBJECTIVE  Olegario Packer is a/an 87 year old male who is seen in consultation at the request of  Lilly Gardner C.N.P. for evaluation of bilateral hip pain.     The patient is seen by themselves.    Onset: Chronic for many years.  Location of Pain: bilateral lower back and radiates proximally up the back.   Worsened by: standing upright, ambulating and getting out of bed.  Better with: both KATHRYN and IA corticosteroid injections both allowed for him to \"walk more upright\"  Treatments tried: corticosteroid injections, lidocaine patches  Associated symptoms: no distal numbness or tingling; denies swelling or warmth    Orthopedic/Surgical history: Chronic back pain  Social History/Occupation: retired    No family history pertinent to patient's problem today.      REVIEW OF SYSTEMS:  Review of Systems  Constitutional, HEENT, cardiovascular, pulmonary, GI, , musculoskeletal, neuro, skin, endocrine and psych systems are negative, except as otherwise noted.    OBJECTIVE:  /58   Wt 84.8 kg (187 lb)   BMI 25.72 kg/m     General: healthy, alert and in no distress  HEENT: no scleral icterus or conjunctival erythema  Skin: no suspicious lesions or rash. No jaundice.  CV: distal perfusion intact    Resp: normal respiratory effort without conversational dyspnea   Psych: normal mood and affect  Gait: normal steady gait with appropriate coordination and balance    Neuro: Normal light sensory exam of bilateral lower extremities    BILATERAL " HIP  Inspection:    No swelling, bruising, discoloration, or obvious deformity or asymmetry  Palpation:    Mild TTP about the anterior groin/joint line. Otherwise all other landmarks are nontender.    Crepitus is Absent  Active Range of Motion:     Flexion full, extension full / IR limited by tightness / ER  limited by tightness  Strength:    Flexion 5/5 / extension 5/5 / adduction 5/5 / abduction 5/5  Special Tests:    Positive: None    Negative: Logroll, FRANKO, anterior impingement (FADIR)    THORACIC/LUMBAR SPINE  Inspection:  Patient in fixed lumbar flexion.  Palpation:  Nontender.  Range of Motion:     Lumbar flexion limited by tightness    Lumbar extension limited by tightness    Right side bend limited by tightness    Left side bend limited by tightness    Right rotation limited by tightness    Left rotation limited by tightness  Strength:    5/5 - quadriceps, hamstrings, tibialis anterior, gastrocsoleus, and extensor hallicus longus  Special Tests:    Positive: None  Negative: straight leg raise (bilateral), slump test (bilateral)      RADIOLOGY:  I independently, visualized and reviewed these images with the patient  Outside hip and spine x-rays and hip/spine MRIs    Large Joint Injection/Arthocentesis: bilateral hip joint    Date/Time: 3/17/2022 6:00 PM  Performed by: Grey Maldonado MD  Authorized by: Grey Maldonado MD     Indications:  Pain and osteoarthritis  Needle Size:  22 G  Guidance: ultrasound    Approach:  Anterior  Location:  Hip  Laterality:  Bilateral      Site:  Bilateral hip joint  Medications (Right):  6 mg betamethasone acet & sod phos 6 (3-3) MG/ML; 2 mL ropivacaine 5 MG/ML  Medications (Left):  6 mg betamethasone acet & sod phos 6 (3-3) MG/ML; 2 mL ropivacaine 5 MG/ML  Outcome:  Tolerated well, no immediate complications  Procedure discussed: discussed risks, benefits, and alternatives    Consent Given by:  Patient  Timeout: timeout called immediately prior to procedure    Prep:  patient was prepped and draped in usual sterile fashion     Ultrasound images of procedure were permanently stored.     Patient reported some improvement of pain after the numbing portion of bilateral hip joint steroid injection.  Ultrasound guided images were permanently stored.   Aftercare instructions given to patient.  Plan to follow-up as discussed above.     Grey Maldonado MD [facility]opedic [facility]           Review of external notes as documented elsewhere in note  Review of the result(s) of each unique test - bilateral hip/spine x-rays/MRIs at outside facility, reviewed outside PCP and ortho notes       Disclaimer: This note consists of symbols derived from keyboarding, dictation and/or voice recognition software. As a result, there may be errors in the script that have gone undetected. Please consider this when interpreting information found in this chart.         Again, thank you for allowing me to participate in the care of your patient.        Sincerely,        Grey Maldonado MD

## 2022-03-21 ENCOUNTER — TELEPHONE (OUTPATIENT)
Dept: FAMILY MEDICINE | Facility: CLINIC | Age: 87
End: 2022-03-21
Payer: COMMERCIAL

## 2022-03-21 ENCOUNTER — TELEPHONE (OUTPATIENT)
Dept: INTERNAL MEDICINE | Facility: CLINIC | Age: 87
End: 2022-03-21
Payer: COMMERCIAL

## 2022-03-21 NOTE — TELEPHONE ENCOUNTER
Called and informed patient and informed him of Lilly's message. I gave him the phone number to call Dr Maldonado.Ele Sinha MA/OSORIO

## 2022-03-21 NOTE — TELEPHONE ENCOUNTER
Cannot have another injection.  My understanding is that this can only be every 3 months.  I ask that he follow-up with orthopedics on this, their note said to follow-up if not improving.   Lilly Gardner, CNP

## 2022-03-21 NOTE — TELEPHONE ENCOUNTER
Closing this, there is another encounter from today with similar message that has been addressed.   Lilly Gardner, CNP

## 2022-03-21 NOTE — TELEPHONE ENCOUNTER
Patient calling wanting to send a message to Lilly Gardner.  Patient had an Injection in Hip on 3/17/22 by Dr. Maldonado. Patient reports that the injection is not working at all, feels as bad as it did before the injection. Previous Injection's have worked great. Patient requesting another Injection and by a different provider. Dr. Maldonado told patient at [facility] to come back in 20 days. Patient is uncomfortable and wants the pain to go away like before after previous injections by a different provider.     Please advise patient: [phone]. Can leave a detailed message  Amanda Peck

## 2022-03-21 NOTE — TELEPHONE ENCOUNTER
Patient is calling, he had 2 injections in his hips. He is still having a lot of pain. He said he has had had this before in the past, and this one is not working. I told him he should probable contact the provider that did the injections, but he said that no one is answering the phone.Ele Sinha MA/OSORIO

## 2022-03-28 RX ORDER — BETAMETHASONE SODIUM PHOSPHATE AND BETAMETHASONE ACETATE 3; 3 MG/ML; MG/ML
6 INJECTION, SUSPENSION INTRA-ARTICULAR; INTRALESIONAL; INTRAMUSCULAR; SOFT TISSUE
Status: SHIPPED | OUTPATIENT
Start: 2022-03-17

## 2022-03-28 RX ORDER — ROPIVACAINE HYDROCHLORIDE 5 MG/ML
2 INJECTION, SOLUTION EPIDURAL; INFILTRATION; PERINEURAL
Status: SHIPPED | OUTPATIENT
Start: 2022-03-17

## 2022-03-29 ENCOUNTER — TELEPHONE (OUTPATIENT)
Dept: ORTHOPEDICS | Facility: CLINIC | Age: 87
End: 2022-03-29
Payer: COMMERCIAL

## 2022-03-30 NOTE — PROGRESS NOTES
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  He is having pain in the lower back bilaterally without pain going down his legs.  There is no significant pain over the joints but he reports feeling tightness and limited ability to stand upright.  Last hip steroid injections on 3/17/22 did not help.  Patient is restricted into a lumbar flexed position unable to fully stand up right on examination.  He has tenderness to palpation over the lower back.  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  Likely cause of pain due to acute worsening of chronic back pain.  Counseled patient on nature of condition and treatment options.  Given this plan as below, follow-up one week.  Can consider possible trigger point versus epidural steroid injections though he would have to hold Plavix for this.  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      -----    SUBJECTIVE:  Olegario Packer is a 87 year old male who is seen in follow-up for lumbar back pain and bilateral hip pain.They were last seen 3/17/2022 and received bilateral hip corticosteroid injections. Injections allowed for no relief.  The patient is seen by themselves.    Since their last visit reports continued lumbar back pain with radiation proximally into the thoracic spine.  They indicate that their current pain level is 10/10. He notes that ambulation is the most painful but is walking in a more upright position today. He notes that he fell down the stairs before coming in today but notes no new symptoms.      Patient's  past medical, surgical, social, and family histories were reviewed today and no changes are noted.    REVIEW OF SYSTEMS:  Constitutional: NEGATIVE for fever, chills, change in weight  Skin: NEGATIVE for worrisome rashes, moles or lesions  GI/: NEGATIVE for bowel or bladder changes  Neuro: NEGATIVE for weakness, dizziness or paresthesias    OBJECTIVE:  /58   Wt 84.8 kg (187 lb)   BMI 25.72 kg/m     General: healthy, alert and in no distress  HEENT: no scleral icterus or conjunctival erythema  Skin: no suspicious lesions or rash. No jaundice.  CV: regular rhythm by palpation, no pedal edema  Resp: normal respiratory effort without conversational dyspnea   Psych: normal mood and affect  Gait: normal steady gait with appropriate coordination and balance  Neuro: normal light touch sensory exam of the extremities.    MSK:    THORACIC/LUMBAR SPINE  Inspection:  Pt in fixed lumbar flexion at about 40 deg    No redness, swelling, overlying skin change, gross deformity/asymmetry, scapular winging  Palpation:    Tender about the left para lumbar muscles and right para lumbar muscles. Otherwise remainder of landmarks are nontender.  Range of Motion:     Lumbar flexion limited by tightness    Lumbar extension limited slightly by pain limited by tightness    Right side bend limited by tightness    Left side bend limited by tightness    Right rotation limited by tightness    Left rotation limited by tightness  Strength:    5/5 - quadriceps, hamstrings, tibialis anterior, gastrocsoleus, and extensor hallicus longus  Special Tests:    Positive: None  Negative: slump test (bilateral)    BILATERAL HIP  Inspection:    No swelling, bruising, discoloration, or obvious deformity or asymmetry  Palpation:  Nontender.    Crepitus is Absent  Active Range of Motion:     Flexion full, extension full / IR limited by tightness / ER  limited by tightness  Strength:    Flexion 5/5 / extension 5/5 / adduction 5/5 / abduction 5/5  Special  Tests:    Positive: None    Negative: FRANKO, anterior impingement (FADIR)    Independent visualization of the below image:       Grey Maldonado MD, [facility] Orthopedic [facility]    Disclaimer: This note consists of symbols derived from keyboarding, dictation and/or voice recognition software. As a result, there may be errors in the script that have gone undetected. Please consider this when interpreting information found in this chart.

## 2022-03-31 ENCOUNTER — ANCILLARY PROCEDURE (OUTPATIENT)
Dept: GENERAL RADIOLOGY | Facility: CLINIC | Age: 87
End: 2022-03-31
Attending: FAMILY MEDICINE
Payer: COMMERCIAL

## 2022-03-31 ENCOUNTER — OFFICE VISIT (OUTPATIENT)
Dept: ORTHOPEDICS | Facility: CLINIC | Age: 87
End: 2022-03-31
Payer: COMMERCIAL

## 2022-03-31 VITALS — BODY MASS INDEX: 25.72 KG/M2 | SYSTOLIC BLOOD PRESSURE: 104 MMHG | WEIGHT: 187 LBS | DIASTOLIC BLOOD PRESSURE: 58 MMHG

## 2022-03-31 DIAGNOSIS — M54.50 CHRONIC BILATERAL LOW BACK PAIN WITHOUT SCIATICA: Primary | ICD-10-CM

## 2022-03-31 DIAGNOSIS — G89.29 CHRONIC BILATERAL LOW BACK PAIN WITHOUT SCIATICA: Primary | ICD-10-CM

## 2022-03-31 DIAGNOSIS — G89.29 CHRONIC BILATERAL LOW BACK PAIN WITHOUT SCIATICA: ICD-10-CM

## 2022-03-31 DIAGNOSIS — M54.50 CHRONIC BILATERAL LOW BACK PAIN WITHOUT SCIATICA: ICD-10-CM

## 2022-03-31 PROCEDURE — 72100 X-RAY EXAM L-S SPINE 2/3 VWS: CPT | Performed by: RADIOLOGY

## 2022-03-31 PROCEDURE — 99214 OFFICE O/P EST MOD 30 MIN: CPT | Performed by: FAMILY MEDICINE

## 2022-03-31 RX ORDER — PREDNISONE 20 MG/1
40 TABLET ORAL DAILY
Qty: 10 TABLET | Refills: 0 | Status: SHIPPED | OUTPATIENT
Start: 2022-03-31 | End: 2022-04-05

## 2022-03-31 ASSESSMENT — PAIN SCALES - GENERAL: PAINLEVEL: WORST PAIN (10)

## 2022-03-31 NOTE — PATIENT INSTRUCTIONS
# Chronic Bilateral Hip and Low Back Pain: Longstanding condition for patient over the past 4+ years.  He is having pain in the lower back bilaterally without pain going down his legs.  There is no significant pain over the joints but he reports feeling tightness and limited ability to stand upright.  Last hip steroid injections on 3/17/22 did not help.  Patient is restricted into a lumbar flexed position unable to fully stand up right on examination.  He has tenderness to palpation over the lower back.  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  Likely cause of pain due to acute worsening of chronic back pain.  Counseled patient on nature of condition and treatment options.  Given this plan as below, follow-up one week.  Can consider possible trigger point versus epidural steroid injections though he would have to hold Plavix for this.  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      It was great seeing you again today!    Grey Maldonado MD

## 2022-03-31 NOTE — LETTER
3/31/2022         RE: Olegario Packer  [id number] [facility] [id number]        Dear Colleague,    Thank you for referring your patient, Olegario Packer, to the [facility]. Please see a copy of my visit note below.    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  He is having pain in the lower back bilaterally without pain going down his legs.  There is no significant pain over the joints but he reports feeling tightness and limited ability to stand upright.  Last hip steroid injections on 3/17/22 did not help.  Patient is restricted into a lumbar flexed position unable to fully stand up right on examination.  He has tenderness to palpation over the lower back.  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  Likely cause of pain due to acute worsening of chronic back pain.  Counseled patient on nature of condition and treatment options.  Given this plan as below, follow-up one week.  Can consider possible trigger point versus epidural steroid injections though he would have to hold Plavix for this.  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      -----    SUBJECTIVE:  Olegario Packer is a 87 year old male who is seen in follow-up for lumbar back pain and bilateral hip pain.They were last seen 3/17/2022 and received bilateral hip corticosteroid injections. Injections allowed for no relief.  The patient is seen by themselves.    Since their last visit reports continued lumbar back pain with radiation proximally into  the thoracic spine.  They indicate that their current pain level is 10/10. He notes that ambulation is the most painful but is walking in a more upright position today. He notes that he fell down the stairs before coming in today but notes no new symptoms.      Patient's past medical, surgical, social, and family histories were reviewed today and no changes are noted.    REVIEW OF SYSTEMS:  Constitutional: NEGATIVE for fever, chills, change in weight  Skin: NEGATIVE for worrisome rashes, moles or lesions  GI/: NEGATIVE for bowel or bladder changes  Neuro: NEGATIVE for weakness, dizziness or paresthesias    OBJECTIVE:  /58   Wt 84.8 kg (187 lb)   BMI 25.72 kg/m     General: healthy, alert and in no distress  HEENT: no scleral icterus or conjunctival erythema  Skin: no suspicious lesions or rash. No jaundice.  CV: regular rhythm by palpation, no pedal edema  Resp: normal respiratory effort without conversational dyspnea   Psych: normal mood and affect  Gait: normal steady gait with appropriate coordination and balance  Neuro: normal light touch sensory exam of the extremities.    MSK:    THORACIC/LUMBAR SPINE  Inspection:  Pt in fixed lumbar flexion at about 40 deg    No redness, swelling, overlying skin change, gross deformity/asymmetry, scapular winging  Palpation:    Tender about the left para lumbar muscles and right para lumbar muscles. Otherwise remainder of landmarks are nontender.  Range of Motion:     Lumbar flexion limited by tightness    Lumbar extension limited slightly by pain limited by tightness    Right side bend limited by tightness    Left side bend limited by tightness    Right rotation limited by tightness    Left rotation limited by tightness  Strength:    5/5 - quadriceps, hamstrings, tibialis anterior, gastrocsoleus, and extensor hallicus longus  Special Tests:    Positive: None  Negative: slump test (bilateral)    BILATERAL HIP  Inspection:    No swelling, bruising, discoloration, or  obvious deformity or asymmetry  Palpation:  Nontender.    Crepitus is Absent  Active Range of Motion:     Flexion full, extension full / IR limited by tightness / ER  limited by tightness  Strength:    Flexion 5/5 / extension 5/5 / adduction 5/5 / abduction 5/5  Special Tests:    Positive: None    Negative: FRANKO, anterior impingement (FADIR)    Independent visualization of the below image:       Grey Maldonado MD, [facility]    Disclaimer: This note consists of symbols derived from keyboarding, dictation and/or voice recognition software. As a result, there may be errors in the script that have gone undetected. Please consider this when interpreting information found in this chart.        Again, thank you for allowing me to participate in the care of your patient.        Sincerely,        Grey Maldonado MD

## 2022-04-14 ENCOUNTER — TELEPHONE (OUTPATIENT)
Dept: ORTHOPEDICS | Facility: CLINIC | Age: 87
End: 2022-04-14
Payer: COMMERCIAL

## 2022-04-14 NOTE — TELEPHONE ENCOUNTER
[facility] Call Center    Phone Message    May a detailed message be left on voicemail: no     Reason for Call: Other: Patient calling to let Dr Moore know the medication he gave him is not working and would like to know what the next step is

## 2022-04-18 NOTE — CONFIDENTIAL NOTE
Will have patient called to have him schedule follow-up to discuss next steps. We could try trigger point injection in the back and get lumbar MRI for possible epidural steroid injection.      Grey Maldonado MD

## 2022-04-18 NOTE — TELEPHONE ENCOUNTER
Called JEFFY Melvin to return phone call to relay follow up and next steps.    Mary Bass [facility], CSCS  Dr. Soares's Extender      Per Dr. Maldonado:  Will have patient called to have him schedule follow-up to discuss next steps. We could try trigger point injection in the back and get lumbar MRI for possible epidural steroid injection.       Grey Maldonado MD

## 2022-04-19 NOTE — TELEPHONE ENCOUNTER
Spoke to patient discussed he noted minimal pain relief while taking oral prednisone, stopped after ~ 3 days due to bilateral lower extremities/lower leg swelling.  This improved ~ 36 hours after discontinuing medication.  Reviewed recommendation from Dr Maldonado for follow up appointment this week to reassess and possibly complete trigger point injections - patient was reluctant to schedule this week due to other commitments.  He currently managing pain with 1000mg tylenol x3 daily with only mild relief, discussed if needing anything to help with sleep.  Patient declined at this time.      I encouraged him multiple times to schedule follow up appointment with Dr Maldonado.  Patient notes he will call early next week to schedule - advised that he should request to reach out to clinical team for possible work in appointment, if necessary.    Asad Sparks, ATC

## 2023-01-01 ENCOUNTER — LAB REQUISITION (OUTPATIENT)
Dept: LAB | Facility: CLINIC | Age: 88
End: 2023-01-01
Payer: COMMERCIAL

## 2023-01-01 DIAGNOSIS — D64.89 OTHER SPECIFIED ANEMIAS: ICD-10-CM

## 2023-01-01 DIAGNOSIS — E03.8 OTHER SPECIFIED HYPOTHYROIDISM: ICD-10-CM

## 2023-01-01 LAB
ALBUMIN SERPL BCG-MCNC: 3.8 G/DL (ref 3.5–5.2)
ALP SERPL-CCNC: 74 U/L (ref 40–129)
ALT SERPL W P-5'-P-CCNC: 19 U/L (ref 10–50)
ANION GAP SERPL CALCULATED.3IONS-SCNC: 12 MMOL/L (ref 7–15)
AST SERPL W P-5'-P-CCNC: 30 U/L (ref 10–50)
BILIRUB SERPL-MCNC: 0.5 MG/DL
BUN SERPL-MCNC: 22.5 MG/DL (ref 8–23)
CALCIUM SERPL-MCNC: 9.4 MG/DL (ref 8.8–10.2)
CHLORIDE SERPL-SCNC: 102 MMOL/L (ref 98–107)
CHOLEST SERPL-MCNC: 68 MG/DL
CREAT SERPL-MCNC: 0.98 MG/DL (ref 0.67–1.17)
DEPRECATED HCO3 PLAS-SCNC: 25 MMOL/L (ref 22–29)
ERYTHROCYTE [DISTWIDTH] IN BLOOD BY AUTOMATED COUNT: 14.2 % (ref 10–15)
ERYTHROCYTE [DISTWIDTH] IN BLOOD BY AUTOMATED COUNT: 14.6 % (ref 10–15)
GFR SERPL CREATININE-BSD FRML MDRD: 75 ML/MIN/1.73M2
GLUCOSE SERPL-MCNC: 112 MG/DL (ref 70–99)
HBA1C MFR BLD: 7.8 %
HCT VFR BLD AUTO: 36.5 % (ref 40–53)
HCT VFR BLD AUTO: 37.8 % (ref 40–53)
HDLC SERPL-MCNC: 31 MG/DL
HGB BLD-MCNC: 11.5 G/DL (ref 13.3–17.7)
HGB BLD-MCNC: 11.8 G/DL (ref 13.3–17.7)
LDLC SERPL CALC-MCNC: 20 MG/DL
MCH RBC QN AUTO: 31.3 PG (ref 26.5–33)
MCH RBC QN AUTO: 31.4 PG (ref 26.5–33)
MCHC RBC AUTO-ENTMCNC: 31.2 G/DL (ref 31.5–36.5)
MCHC RBC AUTO-ENTMCNC: 31.5 G/DL (ref 31.5–36.5)
MCV RBC AUTO: 100 FL (ref 78–100)
MCV RBC AUTO: 100 FL (ref 78–100)
NONHDLC SERPL-MCNC: 37 MG/DL
PLATELET # BLD AUTO: 226 10E3/UL (ref 150–450)
PLATELET # BLD AUTO: 254 10E3/UL (ref 150–450)
POTASSIUM SERPL-SCNC: 4.5 MMOL/L (ref 3.4–5.3)
PROT SERPL-MCNC: 6.3 G/DL (ref 6.4–8.3)
RBC # BLD AUTO: 3.66 10E6/UL (ref 4.4–5.9)
RBC # BLD AUTO: 3.77 10E6/UL (ref 4.4–5.9)
SODIUM SERPL-SCNC: 139 MMOL/L (ref 136–145)
TRIGL SERPL-MCNC: 85 MG/DL
TSH SERPL DL<=0.005 MIU/L-ACNC: 3.27 UIU/ML (ref 0.3–4.2)
VIT B12 SERPL-MCNC: 2980 PG/ML (ref 232–1245)
VIT B12 SERPL-MCNC: 704 PG/ML (ref 232–1245)
WBC # BLD AUTO: 6.8 10E3/UL (ref 4–11)
WBC # BLD AUTO: 8.3 10E3/UL (ref 4–11)

## 2023-01-01 PROCEDURE — 36415 COLL VENOUS BLD VENIPUNCTURE: CPT | Mod: ORL | Performed by: NURSE PRACTITIONER

## 2023-01-01 PROCEDURE — P9603 ONE-WAY ALLOW PRORATED MILES: HCPCS | Mod: ORL | Performed by: NURSE PRACTITIONER

## 2023-01-01 PROCEDURE — 85027 COMPLETE CBC AUTOMATED: CPT | Mod: ORL | Performed by: NURSE PRACTITIONER

## 2023-01-01 PROCEDURE — P9604 ONE-WAY ALLOW PRORATED TRIP: HCPCS | Mod: ORL | Performed by: NURSE PRACTITIONER

## 2023-01-01 PROCEDURE — 82607 VITAMIN B-12: CPT | Mod: ORL | Performed by: NURSE PRACTITIONER

## 2023-01-01 PROCEDURE — 83036 HEMOGLOBIN GLYCOSYLATED A1C: CPT | Mod: ORL | Performed by: NURSE PRACTITIONER

## 2023-01-01 PROCEDURE — 84443 ASSAY THYROID STIM HORMONE: CPT | Mod: ORL | Performed by: NURSE PRACTITIONER

## 2023-01-01 PROCEDURE — 80061 LIPID PANEL: CPT | Mod: ORL | Performed by: NURSE PRACTITIONER

## 2023-01-01 PROCEDURE — 80053 COMPREHEN METABOLIC PANEL: CPT | Mod: ORL | Performed by: NURSE PRACTITIONER
